# Patient Record
Sex: FEMALE | URBAN - METROPOLITAN AREA
[De-identification: names, ages, dates, MRNs, and addresses within clinical notes are randomized per-mention and may not be internally consistent; named-entity substitution may affect disease eponyms.]

---

## 2022-08-16 PROBLEM — Z00.00 ENCOUNTER FOR PREVENTIVE HEALTH EXAMINATION: Status: ACTIVE | Noted: 2022-08-16

## 2023-12-19 ENCOUNTER — INPATIENT (INPATIENT)
Facility: HOSPITAL | Age: 43
LOS: 1 days | Discharge: HOME | DRG: 776 | End: 2023-12-21
Attending: OBSTETRICS & GYNECOLOGY | Admitting: OBSTETRICS & GYNECOLOGY
Payer: COMMERCIAL

## 2023-12-19 ENCOUNTER — EMERGENCY (EMERGENCY)
Facility: HOSPITAL | Age: 43
LOS: 1 days | Discharge: OTHER INSTITUTION | End: 2023-12-19
Attending: EMERGENCY MEDICINE
Payer: COMMERCIAL

## 2023-12-19 VITALS
OXYGEN SATURATION: 98 % | RESPIRATION RATE: 20 BRPM | SYSTOLIC BLOOD PRESSURE: 155 MMHG | DIASTOLIC BLOOD PRESSURE: 93 MMHG | HEART RATE: 88 BPM | TEMPERATURE: 99 F

## 2023-12-19 VITALS
HEART RATE: 100 BPM | RESPIRATION RATE: 20 BRPM | WEIGHT: 205.03 LBS | SYSTOLIC BLOOD PRESSURE: 170 MMHG | TEMPERATURE: 99 F | DIASTOLIC BLOOD PRESSURE: 104 MMHG | HEIGHT: 62 IN | OXYGEN SATURATION: 100 %

## 2023-12-19 VITALS — OXYGEN SATURATION: 100 % | HEART RATE: 85 BPM

## 2023-12-19 DIAGNOSIS — O26.899 OTHER SPECIFIED PREGNANCY RELATED CONDITIONS, UNSPECIFIED TRIMESTER: ICD-10-CM

## 2023-12-19 DIAGNOSIS — Z34.80 ENCOUNTER FOR SUPERVISION OF OTHER NORMAL PREGNANCY, UNSPECIFIED TRIMESTER: ICD-10-CM

## 2023-12-19 LAB
ALBUMIN SERPL ELPH-MCNC: 3.5 G/DL — SIGNIFICANT CHANGE UP (ref 3.3–5)
ALBUMIN SERPL ELPH-MCNC: 3.5 G/DL — SIGNIFICANT CHANGE UP (ref 3.3–5)
ALP SERPL-CCNC: 115 U/L — SIGNIFICANT CHANGE UP (ref 40–120)
ALP SERPL-CCNC: 115 U/L — SIGNIFICANT CHANGE UP (ref 40–120)
ALT FLD-CCNC: 34 U/L — SIGNIFICANT CHANGE UP (ref 10–45)
ALT FLD-CCNC: 34 U/L — SIGNIFICANT CHANGE UP (ref 10–45)
ANION GAP SERPL CALC-SCNC: 10 MMOL/L — SIGNIFICANT CHANGE UP (ref 5–17)
ANION GAP SERPL CALC-SCNC: 10 MMOL/L — SIGNIFICANT CHANGE UP (ref 5–17)
APPEARANCE UR: CLEAR — SIGNIFICANT CHANGE UP
APPEARANCE UR: CLEAR — SIGNIFICANT CHANGE UP
APTT BLD: 29.5 SEC — SIGNIFICANT CHANGE UP (ref 24.5–35.6)
APTT BLD: 29.5 SEC — SIGNIFICANT CHANGE UP (ref 24.5–35.6)
AST SERPL-CCNC: 46 U/L — HIGH (ref 10–40)
AST SERPL-CCNC: 46 U/L — HIGH (ref 10–40)
BACTERIA # UR AUTO: NEGATIVE /HPF — SIGNIFICANT CHANGE UP
BACTERIA # UR AUTO: NEGATIVE /HPF — SIGNIFICANT CHANGE UP
BASE EXCESS BLDV CALC-SCNC: -4.1 MMOL/L — LOW (ref -2–3)
BASE EXCESS BLDV CALC-SCNC: -4.1 MMOL/L — LOW (ref -2–3)
BASOPHILS # BLD AUTO: 0.02 K/UL — SIGNIFICANT CHANGE UP (ref 0–0.2)
BASOPHILS # BLD AUTO: 0.02 K/UL — SIGNIFICANT CHANGE UP (ref 0–0.2)
BASOPHILS NFR BLD AUTO: 0.2 % — SIGNIFICANT CHANGE UP (ref 0–2)
BASOPHILS NFR BLD AUTO: 0.2 % — SIGNIFICANT CHANGE UP (ref 0–2)
BILIRUB DIRECT SERPL-MCNC: <0.1 MG/DL — SIGNIFICANT CHANGE UP (ref 0–0.3)
BILIRUB DIRECT SERPL-MCNC: <0.1 MG/DL — SIGNIFICANT CHANGE UP (ref 0–0.3)
BILIRUB INDIRECT FLD-MCNC: >0.1 MG/DL — LOW (ref 0.2–1)
BILIRUB INDIRECT FLD-MCNC: >0.1 MG/DL — LOW (ref 0.2–1)
BILIRUB SERPL-MCNC: 0.2 MG/DL — SIGNIFICANT CHANGE UP (ref 0.2–1.2)
BILIRUB SERPL-MCNC: 0.2 MG/DL — SIGNIFICANT CHANGE UP (ref 0.2–1.2)
BILIRUB UR-MCNC: NEGATIVE — SIGNIFICANT CHANGE UP
BILIRUB UR-MCNC: NEGATIVE — SIGNIFICANT CHANGE UP
BUN SERPL-MCNC: 10 MG/DL — SIGNIFICANT CHANGE UP (ref 7–23)
BUN SERPL-MCNC: 10 MG/DL — SIGNIFICANT CHANGE UP (ref 7–23)
CA-I SERPL-SCNC: 1.21 MMOL/L — SIGNIFICANT CHANGE UP (ref 1.15–1.33)
CA-I SERPL-SCNC: 1.21 MMOL/L — SIGNIFICANT CHANGE UP (ref 1.15–1.33)
CALCIUM SERPL-MCNC: 8.6 MG/DL — SIGNIFICANT CHANGE UP (ref 8.4–10.5)
CALCIUM SERPL-MCNC: 8.6 MG/DL — SIGNIFICANT CHANGE UP (ref 8.4–10.5)
CAST: 0 /LPF — SIGNIFICANT CHANGE UP (ref 0–4)
CAST: 0 /LPF — SIGNIFICANT CHANGE UP (ref 0–4)
CHLORIDE BLDV-SCNC: 109 MMOL/L — HIGH (ref 96–108)
CHLORIDE BLDV-SCNC: 109 MMOL/L — HIGH (ref 96–108)
CHLORIDE SERPL-SCNC: 106 MMOL/L — SIGNIFICANT CHANGE UP (ref 96–108)
CHLORIDE SERPL-SCNC: 106 MMOL/L — SIGNIFICANT CHANGE UP (ref 96–108)
CO2 BLDV-SCNC: 24 MMOL/L — SIGNIFICANT CHANGE UP (ref 22–26)
CO2 BLDV-SCNC: 24 MMOL/L — SIGNIFICANT CHANGE UP (ref 22–26)
CO2 SERPL-SCNC: 22 MMOL/L — SIGNIFICANT CHANGE UP (ref 22–31)
CO2 SERPL-SCNC: 22 MMOL/L — SIGNIFICANT CHANGE UP (ref 22–31)
COLOR SPEC: YELLOW — SIGNIFICANT CHANGE UP
COLOR SPEC: YELLOW — SIGNIFICANT CHANGE UP
CREAT SERPL-MCNC: 0.64 MG/DL — SIGNIFICANT CHANGE UP (ref 0.5–1.3)
CREAT SERPL-MCNC: 0.64 MG/DL — SIGNIFICANT CHANGE UP (ref 0.5–1.3)
DIFF PNL FLD: ABNORMAL
DIFF PNL FLD: ABNORMAL
EGFR: 112 ML/MIN/1.73M2 — SIGNIFICANT CHANGE UP
EGFR: 112 ML/MIN/1.73M2 — SIGNIFICANT CHANGE UP
EOSINOPHIL # BLD AUTO: 0.07 K/UL — SIGNIFICANT CHANGE UP (ref 0–0.5)
EOSINOPHIL # BLD AUTO: 0.07 K/UL — SIGNIFICANT CHANGE UP (ref 0–0.5)
EOSINOPHIL NFR BLD AUTO: 0.8 % — SIGNIFICANT CHANGE UP (ref 0–6)
EOSINOPHIL NFR BLD AUTO: 0.8 % — SIGNIFICANT CHANGE UP (ref 0–6)
GAS PNL BLDV: 137 MMOL/L — SIGNIFICANT CHANGE UP (ref 136–145)
GAS PNL BLDV: 137 MMOL/L — SIGNIFICANT CHANGE UP (ref 136–145)
GAS PNL BLDV: SIGNIFICANT CHANGE UP
GLUCOSE BLDV-MCNC: 81 MG/DL — SIGNIFICANT CHANGE UP (ref 70–99)
GLUCOSE BLDV-MCNC: 81 MG/DL — SIGNIFICANT CHANGE UP (ref 70–99)
GLUCOSE SERPL-MCNC: 91 MG/DL — SIGNIFICANT CHANGE UP (ref 70–99)
GLUCOSE SERPL-MCNC: 91 MG/DL — SIGNIFICANT CHANGE UP (ref 70–99)
GLUCOSE UR QL: NEGATIVE MG/DL — SIGNIFICANT CHANGE UP
GLUCOSE UR QL: NEGATIVE MG/DL — SIGNIFICANT CHANGE UP
HCO3 BLDV-SCNC: 22 MMOL/L — SIGNIFICANT CHANGE UP (ref 22–29)
HCO3 BLDV-SCNC: 22 MMOL/L — SIGNIFICANT CHANGE UP (ref 22–29)
HCT VFR BLD CALC: 26.9 % — LOW (ref 34.5–45)
HCT VFR BLD CALC: 26.9 % — LOW (ref 34.5–45)
HCT VFR BLDA CALC: 24 % — LOW (ref 34.5–46.5)
HCT VFR BLDA CALC: 24 % — LOW (ref 34.5–46.5)
HGB BLD CALC-MCNC: 8.1 G/DL — LOW (ref 11.7–16.1)
HGB BLD CALC-MCNC: 8.1 G/DL — LOW (ref 11.7–16.1)
HGB BLD-MCNC: 7.9 G/DL — LOW (ref 11.5–15.5)
HGB BLD-MCNC: 7.9 G/DL — LOW (ref 11.5–15.5)
IMM GRANULOCYTES NFR BLD AUTO: 1 % — HIGH (ref 0–0.9)
IMM GRANULOCYTES NFR BLD AUTO: 1 % — HIGH (ref 0–0.9)
INR BLD: 0.94 RATIO — SIGNIFICANT CHANGE UP (ref 0.85–1.18)
INR BLD: 0.94 RATIO — SIGNIFICANT CHANGE UP (ref 0.85–1.18)
KETONES UR-MCNC: NEGATIVE MG/DL — SIGNIFICANT CHANGE UP
KETONES UR-MCNC: NEGATIVE MG/DL — SIGNIFICANT CHANGE UP
LACTATE BLDV-MCNC: 2 MMOL/L — SIGNIFICANT CHANGE UP (ref 0.5–2)
LACTATE BLDV-MCNC: 2 MMOL/L — SIGNIFICANT CHANGE UP (ref 0.5–2)
LDH SERPL L TO P-CCNC: 419 U/L — HIGH (ref 50–242)
LDH SERPL L TO P-CCNC: 419 U/L — HIGH (ref 50–242)
LEUKOCYTE ESTERASE UR-ACNC: NEGATIVE — SIGNIFICANT CHANGE UP
LEUKOCYTE ESTERASE UR-ACNC: NEGATIVE — SIGNIFICANT CHANGE UP
LYMPHOCYTES # BLD AUTO: 1.31 K/UL — SIGNIFICANT CHANGE UP (ref 1–3.3)
LYMPHOCYTES # BLD AUTO: 1.31 K/UL — SIGNIFICANT CHANGE UP (ref 1–3.3)
LYMPHOCYTES # BLD AUTO: 14.3 % — SIGNIFICANT CHANGE UP (ref 13–44)
LYMPHOCYTES # BLD AUTO: 14.3 % — SIGNIFICANT CHANGE UP (ref 13–44)
MAGNESIUM SERPL-MCNC: 2 MG/DL — SIGNIFICANT CHANGE UP (ref 1.6–2.6)
MAGNESIUM SERPL-MCNC: 2 MG/DL — SIGNIFICANT CHANGE UP (ref 1.6–2.6)
MCHC RBC-ENTMCNC: 23.2 PG — LOW (ref 27–34)
MCHC RBC-ENTMCNC: 23.2 PG — LOW (ref 27–34)
MCHC RBC-ENTMCNC: 29.4 GM/DL — LOW (ref 32–36)
MCHC RBC-ENTMCNC: 29.4 GM/DL — LOW (ref 32–36)
MCV RBC AUTO: 78.9 FL — LOW (ref 80–100)
MCV RBC AUTO: 78.9 FL — LOW (ref 80–100)
MONOCYTES # BLD AUTO: 1.15 K/UL — HIGH (ref 0–0.9)
MONOCYTES # BLD AUTO: 1.15 K/UL — HIGH (ref 0–0.9)
MONOCYTES NFR BLD AUTO: 12.6 % — SIGNIFICANT CHANGE UP (ref 2–14)
MONOCYTES NFR BLD AUTO: 12.6 % — SIGNIFICANT CHANGE UP (ref 2–14)
NEUTROPHILS # BLD AUTO: 6.51 K/UL — SIGNIFICANT CHANGE UP (ref 1.8–7.4)
NEUTROPHILS # BLD AUTO: 6.51 K/UL — SIGNIFICANT CHANGE UP (ref 1.8–7.4)
NEUTROPHILS NFR BLD AUTO: 71.1 % — SIGNIFICANT CHANGE UP (ref 43–77)
NEUTROPHILS NFR BLD AUTO: 71.1 % — SIGNIFICANT CHANGE UP (ref 43–77)
NITRITE UR-MCNC: NEGATIVE — SIGNIFICANT CHANGE UP
NITRITE UR-MCNC: NEGATIVE — SIGNIFICANT CHANGE UP
NRBC # BLD: 0 /100 WBCS — SIGNIFICANT CHANGE UP (ref 0–0)
NRBC # BLD: 0 /100 WBCS — SIGNIFICANT CHANGE UP (ref 0–0)
NT-PROBNP SERPL-SCNC: 798 PG/ML — HIGH (ref 0–300)
NT-PROBNP SERPL-SCNC: 798 PG/ML — HIGH (ref 0–300)
PCO2 BLDV: 45 MMHG — HIGH (ref 39–42)
PCO2 BLDV: 45 MMHG — HIGH (ref 39–42)
PH BLDV: 7.3 — LOW (ref 7.32–7.43)
PH BLDV: 7.3 — LOW (ref 7.32–7.43)
PH UR: 7 — SIGNIFICANT CHANGE UP (ref 5–8)
PH UR: 7 — SIGNIFICANT CHANGE UP (ref 5–8)
PLATELET # BLD AUTO: 377 K/UL — SIGNIFICANT CHANGE UP (ref 150–400)
PLATELET # BLD AUTO: 377 K/UL — SIGNIFICANT CHANGE UP (ref 150–400)
PO2 BLDV: 30 MMHG — SIGNIFICANT CHANGE UP (ref 25–45)
PO2 BLDV: 30 MMHG — SIGNIFICANT CHANGE UP (ref 25–45)
POTASSIUM BLDV-SCNC: 4.2 MMOL/L — SIGNIFICANT CHANGE UP (ref 3.5–5.1)
POTASSIUM BLDV-SCNC: 4.2 MMOL/L — SIGNIFICANT CHANGE UP (ref 3.5–5.1)
POTASSIUM SERPL-MCNC: 4.1 MMOL/L — SIGNIFICANT CHANGE UP (ref 3.5–5.3)
POTASSIUM SERPL-MCNC: 4.1 MMOL/L — SIGNIFICANT CHANGE UP (ref 3.5–5.3)
POTASSIUM SERPL-SCNC: 4.1 MMOL/L — SIGNIFICANT CHANGE UP (ref 3.5–5.3)
POTASSIUM SERPL-SCNC: 4.1 MMOL/L — SIGNIFICANT CHANGE UP (ref 3.5–5.3)
PROT SERPL-MCNC: 6.2 G/DL — SIGNIFICANT CHANGE UP (ref 6–8.3)
PROT SERPL-MCNC: 6.2 G/DL — SIGNIFICANT CHANGE UP (ref 6–8.3)
PROT UR-MCNC: NEGATIVE MG/DL — SIGNIFICANT CHANGE UP
PROT UR-MCNC: NEGATIVE MG/DL — SIGNIFICANT CHANGE UP
PROTHROM AB SERPL-ACNC: 10.4 SEC — SIGNIFICANT CHANGE UP (ref 9.5–13)
PROTHROM AB SERPL-ACNC: 10.4 SEC — SIGNIFICANT CHANGE UP (ref 9.5–13)
RBC # BLD: 3.41 M/UL — LOW (ref 3.8–5.2)
RBC # BLD: 3.41 M/UL — LOW (ref 3.8–5.2)
RBC # FLD: 17.3 % — HIGH (ref 10.3–14.5)
RBC # FLD: 17.3 % — HIGH (ref 10.3–14.5)
RBC CASTS # UR COMP ASSIST: 1 /HPF — SIGNIFICANT CHANGE UP (ref 0–4)
RBC CASTS # UR COMP ASSIST: 1 /HPF — SIGNIFICANT CHANGE UP (ref 0–4)
REVIEW: SIGNIFICANT CHANGE UP
REVIEW: SIGNIFICANT CHANGE UP
SAO2 % BLDV: 30.5 % — LOW (ref 67–88)
SAO2 % BLDV: 30.5 % — LOW (ref 67–88)
SODIUM SERPL-SCNC: 138 MMOL/L — SIGNIFICANT CHANGE UP (ref 135–145)
SODIUM SERPL-SCNC: 138 MMOL/L — SIGNIFICANT CHANGE UP (ref 135–145)
SP GR SPEC: 1.01 — SIGNIFICANT CHANGE UP (ref 1–1.03)
SP GR SPEC: 1.01 — SIGNIFICANT CHANGE UP (ref 1–1.03)
SQUAMOUS # UR AUTO: 2 /HPF — SIGNIFICANT CHANGE UP (ref 0–5)
SQUAMOUS # UR AUTO: 2 /HPF — SIGNIFICANT CHANGE UP (ref 0–5)
TROPONIN T, HIGH SENSITIVITY RESULT: 17 NG/L — SIGNIFICANT CHANGE UP (ref 0–51)
TROPONIN T, HIGH SENSITIVITY RESULT: 17 NG/L — SIGNIFICANT CHANGE UP (ref 0–51)
URATE SERPL-MCNC: 4.3 MG/DL — SIGNIFICANT CHANGE UP (ref 2.5–7)
URATE SERPL-MCNC: 4.3 MG/DL — SIGNIFICANT CHANGE UP (ref 2.5–7)
UROBILINOGEN FLD QL: 1 MG/DL — SIGNIFICANT CHANGE UP (ref 0.2–1)
UROBILINOGEN FLD QL: 1 MG/DL — SIGNIFICANT CHANGE UP (ref 0.2–1)
WBC # BLD: 9.15 K/UL — SIGNIFICANT CHANGE UP (ref 3.8–10.5)
WBC # BLD: 9.15 K/UL — SIGNIFICANT CHANGE UP (ref 3.8–10.5)
WBC # FLD AUTO: 9.15 K/UL — SIGNIFICANT CHANGE UP (ref 3.8–10.5)
WBC # FLD AUTO: 9.15 K/UL — SIGNIFICANT CHANGE UP (ref 3.8–10.5)
WBC UR QL: 1 /HPF — SIGNIFICANT CHANGE UP (ref 0–5)
WBC UR QL: 1 /HPF — SIGNIFICANT CHANGE UP (ref 0–5)

## 2023-12-19 PROCEDURE — 76376 3D RENDER W/INTRP POSTPROCES: CPT | Mod: 26

## 2023-12-19 PROCEDURE — XXXXX: CPT | Mod: 1L

## 2023-12-19 PROCEDURE — 70450 CT HEAD/BRAIN W/O DYE: CPT | Mod: 26,MA

## 2023-12-19 PROCEDURE — 99222 1ST HOSP IP/OBS MODERATE 55: CPT

## 2023-12-19 PROCEDURE — 93356 MYOCRD STRAIN IMG SPCKL TRCK: CPT

## 2023-12-19 PROCEDURE — 99291 CRITICAL CARE FIRST HOUR: CPT

## 2023-12-19 PROCEDURE — 93308 TTE F-UP OR LMTD: CPT | Mod: 26

## 2023-12-19 PROCEDURE — 71045 X-RAY EXAM CHEST 1 VIEW: CPT | Mod: 26

## 2023-12-19 PROCEDURE — 93306 TTE W/DOPPLER COMPLETE: CPT | Mod: 26

## 2023-12-19 PROCEDURE — 71275 CT ANGIOGRAPHY CHEST: CPT | Mod: 26,MA

## 2023-12-19 RX ORDER — MAGNESIUM SULFATE 500 MG/ML
4 VIAL (ML) INJECTION ONCE
Refills: 0 | Status: DISCONTINUED | OUTPATIENT
Start: 2023-12-19 | End: 2023-12-19

## 2023-12-19 RX ORDER — ACETAMINOPHEN 500 MG
975 TABLET ORAL ONCE
Refills: 0 | Status: COMPLETED | OUTPATIENT
Start: 2023-12-19 | End: 2023-12-19

## 2023-12-19 RX ORDER — LABETALOL HCL 100 MG
200 TABLET ORAL EVERY 12 HOURS
Refills: 0 | Status: DISCONTINUED | OUTPATIENT
Start: 2023-12-19 | End: 2023-12-19

## 2023-12-19 RX ORDER — MAGNESIUM SULFATE 500 MG/ML
2 VIAL (ML) INJECTION
Qty: 40 | Refills: 0 | Status: DISCONTINUED | OUTPATIENT
Start: 2023-12-19 | End: 2023-12-19

## 2023-12-19 RX ORDER — FUROSEMIDE 40 MG
40 TABLET ORAL ONCE
Refills: 0 | Status: COMPLETED | OUTPATIENT
Start: 2023-12-19 | End: 2023-12-19

## 2023-12-19 RX ORDER — HEPARIN SODIUM 5000 [USP'U]/ML
5000 INJECTION INTRAVENOUS; SUBCUTANEOUS EVERY 12 HOURS
Refills: 0 | Status: DISCONTINUED | OUTPATIENT
Start: 2023-12-19 | End: 2023-12-21

## 2023-12-19 RX ORDER — HEPARIN SODIUM 5000 [USP'U]/ML
5000 INJECTION INTRAVENOUS; SUBCUTANEOUS EVERY 12 HOURS
Refills: 0 | Status: DISCONTINUED | OUTPATIENT
Start: 2023-12-19 | End: 2023-12-19

## 2023-12-19 RX ORDER — NIFEDIPINE 30 MG
30 TABLET, EXTENDED RELEASE 24 HR ORAL DAILY
Refills: 0 | Status: DISCONTINUED | OUTPATIENT
Start: 2023-12-19 | End: 2023-12-20

## 2023-12-19 RX ORDER — NIFEDIPINE 30 MG
30 TABLET, EXTENDED RELEASE 24 HR ORAL DAILY
Refills: 0 | Status: DISCONTINUED | OUTPATIENT
Start: 2023-12-19 | End: 2023-12-23

## 2023-12-19 RX ORDER — IBUPROFEN 200 MG
600 TABLET ORAL ONCE
Refills: 0 | Status: COMPLETED | OUTPATIENT
Start: 2023-12-19 | End: 2023-12-19

## 2023-12-19 RX ORDER — ACETAMINOPHEN 500 MG
975 TABLET ORAL EVERY 6 HOURS
Refills: 0 | Status: DISCONTINUED | OUTPATIENT
Start: 2023-12-19 | End: 2023-12-23

## 2023-12-19 RX ADMIN — Medication 975 MILLIGRAM(S): at 18:33

## 2023-12-19 RX ADMIN — Medication 30 MILLIGRAM(S): at 21:26

## 2023-12-19 RX ADMIN — Medication 975 MILLIGRAM(S): at 19:21

## 2023-12-19 RX ADMIN — Medication 600 MILLIGRAM(S): at 20:40

## 2023-12-19 RX ADMIN — Medication 40 MILLIGRAM(S): at 18:33

## 2023-12-19 NOTE — PATIENT PROFILE ADULT - FALL HARM RISK - UNIVERSAL INTERVENTIONS
Bed in lowest position, wheels locked, appropriate side rails in place/Call bell, personal items and telephone in reach/Instruct patient to call for assistance before getting out of bed or chair/Non-slip footwear when patient is out of bed/[place] to call system/Physically safe environment - no spills, clutter or unnecessary equipment/Purposeful Proactive Rounding/Room/bathroom lighting operational, light cord in reach Bed in lowest position, wheels locked, appropriate side rails in place/Call bell, personal items and telephone in reach/Instruct patient to call for assistance before getting out of bed or chair/Non-slip footwear when patient is out of bed/[place] to call system/Physically safe environment - no spills, clutter or unnecessary equipment/Purposeful Proactive Rounding/Room/bathroom lighting operational, light cord in reach

## 2023-12-19 NOTE — ED PROCEDURE NOTE - US CPT CODES
[id number] Echocardiography Transthoracic with Image 2D (Echo/FAST) [id number] Echocardiography Transthoracic with Image 2D (Echo/FAST)

## 2023-12-19 NOTE — ED PROVIDER NOTE - ATTENDING CONTRIBUTION TO CARE
This is 43-year-old female status post  section now with shortness of breath and worsening of her leg swellings.  Blood pressures 170s at home and she is not taking any medications at this time.  Pitting edema bilaterally regular rate and rhythm clear lungs awake alert talking no acute distress  Discussed with OB/GYN.  On initial evaluation the patient only had 1 episode of blood pressure above 160 and they did not recommend magnesium.  On repeat blood pressure was in the 140s.  They recommended serial blood pressures.  During the evaluation the diastolic blood pressure was 110 and they recommended magnesium.  We started magnesium infusion.  Admit to the OB/GYN unit.    CT head / cbc/cmp/ua  tte bedside w bline - dw cards as noted. rec for lasix / tte  Evaluating for critical conditions noted above. Ordering tests. Reviewing results. Ordering medications as noted in MAR. Discussions with consultant.

## 2023-12-19 NOTE — ED PROVIDER NOTE - CARE PLAN
Principal Discharge DX:	Pre-eclampsia, postpartum  Secondary Diagnosis:	Acute systolic congestive heart failure   1

## 2023-12-19 NOTE — ED ADULT NURSE NOTE - NSFALLRISKINTERV_ED_ALL_ED
Assistance OOB with selected safe patient handling equipment if applicable/Assistance with ambulation/Communicate fall risk and risk factors to all staff, patient, and family/Monitor gait and stability/Provide visual cue: yellow wristband, yellow gown, etc/Reinforce activity limits and safety measures with patient and family/Call bell, personal items and telephone in reach/Instruct patient to call for assistance before getting out of bed/chair/stretcher/Non-slip footwear applied when patient is off stretcher/[place] to call system/Physically safe environment - no spills, clutter or unnecessary equipment/Purposeful Proactive Rounding/Room/bathroom lighting operational, light cord in reach Assistance OOB with selected safe patient handling equipment if applicable/Assistance with ambulation/Communicate fall risk and risk factors to all staff, patient, and family/Monitor gait and stability/Provide visual cue: yellow wristband, yellow gown, etc/Reinforce activity limits and safety measures with patient and family/Call bell, personal items and telephone in reach/Instruct patient to call for assistance before getting out of bed/chair/stretcher/Non-slip footwear applied when patient is off stretcher/[place] to call system/Physically safe environment - no spills, clutter or unnecessary equipment/Purposeful Proactive Rounding/Room/bathroom lighting operational, light cord in reach

## 2023-12-19 NOTE — ED ADULT NURSE NOTE - OBJECTIVE STATEMENT
Patient c/o headache and HTN 5 days postpartum. Patient had preeclampsia throughout pregnancy, C section performed on 12/13 (41 wks). Patient also c/o b/l LE swelling which she states she has had since 38 wks. Patient denies fever, n/v/d, sob, chest pain. Patient A&Ox4, ambulatory. Placed on cardiac monitor and closely monitoring BP.

## 2023-12-19 NOTE — ED ADULT NURSE REASSESSMENT NOTE - NS ED NURSE REASSESS COMMENT FT1
Report received from CARMEL Fulton. PT is resting comfortably in bed, breathing unlabored on room air, and speaking in complete sentences. Updated PT on plan of care. Report given to OB by day shift RN, awaiting transport to OB PACU. Safety and comfort maintained. Call bell within reach. Family at the bedside.

## 2023-12-19 NOTE — CONSULT NOTE ADULT - ATTENDING COMMENTS
Peripartum HTN, edema. Improving with nifedipine and diuretics. Echo unremarkable. Continue to optimize medications / volume status. Should be seen by cardio-OB.

## 2023-12-19 NOTE — H&P ADULT - ASSESSMENT
Patient is a 44yo  now POD#6 s/p primary  section for worsening preeclampsia with severe features presenting with headache and SOB for the past 2 days. First BP on presentation to /104 with persistent mild range pressures for several hours, followed by second severe range pressure of 141/110. Will admit to antepartum service for management of preeclampsia with severe features.    #sPEC  - Start Procardia 30XL daily, cleared by preliminary Cards recs  - Not requiring immediate release antihypertensive at this time, as severe range pressures >1hr apart  - Start Magnesium for seizure prophylaxis  - AM HELLP labs  - Will likely plan for Cardio OB follow up outpatient    #B/l pleural effusions  - Cardio: TTE to r/o PPCM, CTA to rule out PE, Lasix 40mg IVP  - CXR-p: small layering right and trace left pleural effusions    #PP state  - PO Tylenol, Motrin as needed for postoperative pain control  - SCDs for DVT prophylaxis, will hold HSQ in setting of anemia  - Regular diet    Seen with GYN service attending, Dr. Rashi Zamora PGY2

## 2023-12-19 NOTE — H&P ADULT - NSHPPHYSICALEXAM_GEN_ALL_CORE
Vital Signs Last 24 Hrs  T(C): 37 (19 Dec 2023 14:54), Max: 37 (19 Dec 2023 14:54)  T(F): 98.6 (19 Dec 2023 14:54), Max: 98.6 (19 Dec 2023 14:54)  HR: 84 (19 Dec 2023 18:00) (84 - 100)  BP: 143/89 (19 Dec 2023 18:00) (135/83 - 170/104)  BP(mean): 110 (19 Dec 2023 15:45) (110 - 113)  RR: 18 (19 Dec 2023 18:00) (18 - 20)  SpO2: 100% (19 Dec 2023 18:00) (100% - 100%)    Parameters below as of 19 Dec 2023 18:00  Patient On (Oxygen Delivery Method): room air    Physical Exam:   General: sitting comfortably in bed, NAD at rest  HEENT: neck supple, full ROM  CV: extremities well perfused  Lungs: visibly dyspneic when talking  Abd: Soft, mild tenderness to lower abd near incision, non-distended  Skin: Pfannensteil incision with small regions of superficial separation, no drainage, no erythema or induration  Ext: 2-3+ pitting edema to level of knees bilaterally

## 2023-12-19 NOTE — H&P ADULT - NSHPLABSRESULTS_GEN_ALL_CORE
LABS:                        7.9    9.15  )-----------( 377      ( 19 Dec 2023 15:25 )             26.9     -    138  |  106  |  10  ----------------------------<  91  4.1   |  22  |  0.64    Ca    8.6      19 Dec 2023 15:25  Mg     2.0         TPro  6.2  /  Alb  3.5  /  TBili  0.2  /  DBili  <0.1  /  AST  46<H>  /  ALT  34  /  AlkPhos  115  12-    PT/INR - ( 19 Dec 2023 15:25 )   PT: 10.4 sec;   INR: 0.94 ratio    PTT - ( 19 Dec 2023 15:25 )  PTT:29.5 sec    I&O's Detail  Urinalysis Basic - ( 19 Dec 2023 17:13 )    Color: Yellow / Appearance: Clear / S.007 / pH: x  Gluc: x / Ketone: Negative mg/dL  / Bili: Negative / Urobili: 1.0 mg/dL   Blood: x / Protein: Negative mg/dL / Nitrite: Negative   Leuk Esterase: Negative / RBC: 1 /HPF / WBC 1 /HPF   Sq Epi: x / Non Sq Epi: 2 /HPF / Bacteria: Negative /HPF    RADIOLOGY & ADDITIONAL STUDIES:  < from: POCUS ED TTE 2D F/U, Limited w/o Cont. (23 @ 15:50) >  Procedure was performed in the Emergency Department by a credentialed   Emergency Medicine Attending Physician    EXAM:  ER TTE LIMITED      ORDER COMMENTS: post partum     PROCEDURE DATE:  2023    FOCUSED ED ULTRASOUND REPORT    INTERPRETATION:  A focused transthoracic cardiac ultrasound examination   was performed.  A trace pericardial effusion was visualized.  No global wall motion abnormality was identified.  The right ventricle is not grossly dilated.  The IVC is not plethoric.  The apical lung fields display lung sliding and a line predominance   bilaterally.  The axillary lung fields display b lines diffusely.  There are bilateral small pleural effusions.    IMPRESSION:  Trace Pericardial Effusion with no gross evidence of tamponade.  Bilateral b-lines and small pleural effusions suggesting pulmonary edema   are present.    --- End of Report ---    SHAR LYNCH MD; Attending Emergency Medicine  This document has been electronically signed. Dec 19 2023  3:50PM    < end of copied text >

## 2023-12-19 NOTE — ED PROVIDER NOTE - PHYSICAL EXAMINATION
Gen: Patient is NAD, AAOx3, able to ambulate without assistance  HEENT: NCAT, normal conjunctiva, tongue midline, oral mucosa moist  Lung: CTAB, mildly tachypneic, no wheezes/rhonchi/rales B/L,   CV: RRR, no murmurs, rubs or gallops, distal pulses 2+ b/l  Abd: soft, incision site is CDI, generalized tenderness, ND, no guarding, no rigidity, no rebound tenderness  MSK: no visible deformities, ROM normal in UE/LE  Neuro: No focal sensory or motor deficits  Skin: Warm, well perfused, no rash, pitting edema bilaterally   Psych: normal affect, calm

## 2023-12-19 NOTE — H&P ADULT - HISTORY OF PRESENT ILLNESS
MELINDA SCALES  43y  Female [id number]    HPI: Patient is a 44yo  now POD#6 s/p primary  section for worsening preeclampsia with severe features presenting with headache and SOB for the past 2 days. Pt reports she had a scheduled late term induction of labor on  at [facility], she then developed preeclampsia with severe features intrapartum, was started on mg for seizure prophylaxis and antihypertensives. Mode of delivery was reportedly  () due to worsening preeclampsia, per patient. She was discharged on POD#3 without a plan for continuation of antihypertensives. Pt reports onset of headache  morning, with shortness of breath on light exertion beginning yesterday. Also reports significant LE swelling that has been stable since 38wks gestation. Denies vision changes, RUQ pain, epigastric pain, nausea, vomiting. Of note, pt had been taking bASA for preeclampsia prevention in the setting of advanced maternal age, IVF pregnancy.    Name of GYN Physician: [facility], delivered by Dr. Behar at [facility]  OBHx: SAB x1, MAB x1 with D&C, primary  section x1 (23) c/b sPEC  GynHx: Denies fibroids, cysts, endometriosis, STI's, Abnormal pap smears   PMH: migraines, asthma (last used inhaler last winter)  PSH: C/s x1, D&C x1 foot surgery  Meds: Albuterol prn (has not used since last winter)  Allx: NKDA  Social History: former cigarette use, denies current smoking use, drug use, alcohol use MELINDA SCALES  43y  Female [id number]    HPI: Patient is a 44yo  now POD#6 s/p primary  section for worsening preeclampsia with severe features presenting with headache and SOB for the past 2 days. Pt reports she had a scheduled late term induction of labor on  at [facility], she then developed preeclampsia with severe features intrapartum, was started on mg for seizure prophylaxis and antihypertensives. Mode of delivery was reportedly  () due to worsening preeclampsia, per patient. She was discharged on POD#3 without a plan for continuation of antihypertensives. Pt reports onset of headache  morning, with shortness of breath on light exertion beginning yesterday. Also reports significant LE swelling that has been stable since 38wks gestation. Denies vision changes, RUQ pain, epigastric pain, nausea, vomiting. Of note, pt had been taking bASA for preeclampsia prevention in the setting of advanced maternal age, IVF pregnancy.    Name of GYN Physician: [facility], delivered by Dr. Behar at [facility]  OBHx: SAB x1, MAB x1 with D&C, primary  section x1 (23) c/b sPEC  GynHx: Denies fibroids, cysts, endometriosis, STI's, Abnormal pap smears   PMH: migraines, asthma (last used inhaler last winter)  PSH: C/s x1, D&C x1 foot surgery  Meds: Albuterol prn (has not used since last winter)  Allx: NKDA  Social History: former cigarette use, denies current smoking use, drug use, alcohol use

## 2023-12-19 NOTE — CONSULT NOTE ADULT - ASSESSMENT
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    Recommendations:  - TTE to evaluate for PPCM  - Agree with CTA to rule out PE. Given history of multiple miscarriages, if clot is present, APLS could be on differential  - Can give nifedipine or hydralazine for elevated BPs.  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    Recommendations:  - TTE to evaluate for PPCM  - Agree with CTA to rule out PE. Given history of multiple miscarriages, if clot is present, APLS could be on differential  - Can give nifedipine or hydralazine for elevated BPs.  - Magnesium for pre-eclampsia per Gyn  - Give 40mg IV Lasix now  - Cardiology to follow    ***Note not finalized until co-signed by attending***    Conrad Mckinney MD  Cardiology Fellow  All Cardiology service information can be found  on [email or web address], password: [email or web address]

## 2023-12-19 NOTE — ED PROVIDER NOTE - OBJECTIVE STATEMENT
43y F, hx of asthma, recent  on  due to pre-eclampsia (41-wk), p/w 43y F, hx of asthma, recent  on  due to pre-eclampsia (41-wk), p/w HA, SOB, b/l LE swelling. No fever, abdominal pain, abnormal vaginal bleeding, discharge.  on arrival. Not on anit-hypertensive medications.

## 2023-12-19 NOTE — H&P ADULT - ATTENDING COMMENTS
/OBGYN Attending Note: Agree with above, rhona seen by me in the ER with the resident and Admitted   44 y/o  021 S/P C/S 1 week ago at a different hospital history of preeclampsia not currently on antihypertensive. Rhona presents with headache and shortness of breath, + severe range blood pressures noted, Ruled out for a pulmonary embolism by EM. PE: Pt in NAD, appeared SOB, +elevated BP  Abdomen: Soft, nondistended, + mild tnederness, no rebound, incision minimal superficial skin separation in the midline, but intact, not bleeding, no drainage of pus, no erythema.  Admit to postpartum service with Postpartum rebound preeclampsia. Paitnet started on antihypertensive, procardia 30 mg XL, patinet to obtain a CT of the head. Patinet evaluated by cardiology, recommendations echocardiogram, lasix for b/l pleural efufsions. Consider antiseizure medication with keppra possibly magnesium sulfate if meets criteria and is stable. Continue to monitor.  Danica Markham MD /OBGYN Attending Note: Agree with above, rhona seen by me in the ER with the resident and Admitted   42 y/o  021 S/P C/S 1 week ago at a different hospital history of preeclampsia not currently on antihypertensive. Rhona presents with headache and shortness of breath, + severe range blood pressures noted, Ruled out for a pulmonary embolism by EM. PE: Pt in NAD, appeared SOB, +elevated BP  Abdomen: Soft, nondistended, + mild tnederness, no rebound, incision minimal superficial skin separation in the midline, but intact, not bleeding, no drainage of pus, no erythema.  Admit to postpartum service with Postpartum rebound preeclampsia. Paitnet started on antihypertensive, procardia 30 mg XL, patinet to obtain a CT of the head. Patinet evaluated by cardiology, recommendations echocardiogram, lasix for b/l pleural efufsions. Consider antiseizure medication with keppra possibly magnesium sulfate if meets criteria and is stable. Continue to monitor.  Danica Markham MD

## 2023-12-19 NOTE — PATIENT PROFILE ADULT - DOES PATIENT HAVE ADVANCE DIRECTIVE
Maryanne Perea  Obstetrics and Gynecology  [address] [place] [id number]  Phone: [phone]  Fax: [phone]  Follow Up Time:    No

## 2023-12-19 NOTE — CONSULT NOTE ADULT - SUBJECTIVE AND OBJECTIVE BOX
Conrad Mckinney MD  Cardiology Fellow  All Cardiology service information can be found  on [email or web address], password: [email or web address]    Patient seen and evaluated at bedside    Chief Complaint: Shortness of breath    HPI:  44 yo  s/p  on  complicated by pre-eclampsia during labor at [facility] presents with complaints of worsening shortness of breath, headache, and bilateral lower extremity edema. The symptoms have been getting worse for 3 days, since being discharged on Saturday. She denies any chest pain or cardiac history. She reports that her BPs were not controlled when she was discharged, and was not discharged on any antihypertensives.     Shortness of breath is exertional and she also endorses orthopnea. Headache is not associated with numbness or weakness. Lower extremity edema has been present since she was 38 weeks and has been worsening. Until the pre-eclampsia, this pregnancy has been uncomplicated and her baby is in good health.    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    In ED, afebrile, HR , /104, RR 20, SpO2 100% on RA    PMHx:   Pre-eclampsia    Asthma    PSHx:    23    Allergies:  No Known Allergies    Home Medications:  ASA 81    Current Medications:   acetaminophen     Tablet .. 975 milliGRAM(s) Oral once  furosemide   Injectable 40 milliGRAM(s) IV Push Once      FAMILY HISTORY:  HTN    Social History:  Smoking History: Denies  Alcohol Use:Denies  Drug Use: Denies    REVIEW OF SYSTEMS:  CONSTITUTIONAL: No weakness, fevers or chills  RESPIRATORY: No cough, wheezing, hemoptysis; No shortness of breath  CARDIOVASCULAR: No chest pain or palpitations; +lower extremity edema  HEME: No bleeding  GENITOURINARY: No dysuria, frequency or hematuria  NEUROLOGICAL: No numbness or weakness. +HA  SKIN: No itching, burning, rashes, or lesions   All other review of systems is negative unless indicated above.    Physical Exam:  T(F): 98.6 (12-19), Max: 98.6 (12-19)  HR: 92 (12-19) (92 - 100)  BP: 146/83 (12-19) (135/83 - 170/104)  RR: 18 (12-19)  SpO2: 100% (12-19)  GENERAL: Uncomfortable appearing woman sitting upright  HEAD:  Atraumatic, Normocephalic  ENT: EOMI, PERRLA, conjunctiva and sclera clear,+ JVD, moist mucosa  CHEST/LUNG: Faint crackles bilaterally; No wheeze, equal breath sounds bilaterally. Breathing is labored on room air  HEART: Regular rate and rhythm;  Brief, late-peaking 2/6 murmur in LUSB.  EXTREMITIES:  3+ BLE edema  PSYCH: Nl behavior, nl affect  NEUROLOGY: AAOx3, non-focal, cranial nerves intact  SKIN: Normal color, No rashes or lesions    LINES:    Cardiovascular Diagnostic Testing:    ECG: Personally reviewed:  Sinus rhythm HR 90, no evidence of ischemia    Echo: Personally reviewed:    Stress Testing:    Cath:    Imaging:    CXR: Personally reviewed  < from: Xray Chest 1 View AP/PA (23 @ 17:27) >  INTERPRETATION:  Small layering right and trace left pleural effusions.    < end of copied text >      Labs: Personally reviewed                        7.9    9.15  )-----------( 377      ( 19 Dec 2023 15:25 )             26.9     12-    138  |  106  |  10  ----------------------------<  91  4.1   |  22  |  0.64    Ca    8.6      19 Dec 2023 15:25  Mg     2.0     12-    TPro  6.2  /  Alb  3.5  /  TBili  0.2  /  DBili  <0.1  /  AST  46<H>  /  ALT  34  /  AlkPhos  115  12-    PT/INR - ( 19 Dec 2023 15:25 )   PT: 10.4 sec;   INR: 0.94 ratio         PTT - ( 19 Dec 2023 15:25 )  PTT:29.5 sec    CARDIAC MARKERS ( 19 Dec 2023 15:25 )  17 ng/L / x     / x     / x     / x     / x        Pro-Brain Natriuretic Peptide: 798 pg/mL (23 @ 15:25)            oCnrad Mckinney MD  Cardiology Fellow  All Cardiology service information can be found  on [email or web address], password: [email or web address]    Patient seen and evaluated at bedside    Chief Complaint: Shortness of breath    HPI:  44 yo  s/p  on  complicated by pre-eclampsia during labor at [facility] presents with complaints of worsening shortness of breath, headache, and bilateral lower extremity edema. The symptoms have been getting worse for 3 days, since being discharged on Saturday. She denies any chest pain or cardiac history. She reports that her BPs were not controlled when she was discharged, and was not discharged on any antihypertensives.     Shortness of breath is exertional and she also endorses orthopnea. Headache is not associated with numbness or weakness. Lower extremity edema has been present since she was 38 weeks and has been worsening. Until the pre-eclampsia, this pregnancy has been uncomplicated and her baby is in good health.    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    In ED, afebrile, HR , /104, RR 20, SpO2 100% on RA    PMHx:   Pre-eclampsia    Asthma    PSHx:    23    Allergies:  No Known Allergies    Home Medications:  ASA 81    Current Medications:   acetaminophen     Tablet .. 975 milliGRAM(s) Oral once  furosemide   Injectable 40 milliGRAM(s) IV Push Once      FAMILY HISTORY:  HTN    Social History:  Smoking History: Denies  Alcohol Use:Denies  Drug Use: Denies    REVIEW OF SYSTEMS:  CONSTITUTIONAL: No weakness, fevers or chills  RESPIRATORY: No cough, wheezing, hemoptysis; No shortness of breath  CARDIOVASCULAR: No chest pain or palpitations; +lower extremity edema  HEME: No bleeding  GENITOURINARY: No dysuria, frequency or hematuria  NEUROLOGICAL: No numbness or weakness. +HA  SKIN: No itching, burning, rashes, or lesions   All other review of systems is negative unless indicated above.    Physical Exam:  T(F): 98.6 (12-19), Max: 98.6 (12-19)  HR: 92 (12-19) (92 - 100)  BP: 146/83 (12-19) (135/83 - 170/104)  RR: 18 (12-19)  SpO2: 100% (12-19)  GENERAL: Uncomfortable appearing woman sitting upright  HEAD:  Atraumatic, Normocephalic  ENT: EOMI, PERRLA, conjunctiva and sclera clear,+ JVD, moist mucosa  CHEST/LUNG: Faint crackles bilaterally; No wheeze, equal breath sounds bilaterally. Breathing is labored on room air  HEART: Regular rate and rhythm;  Brief, late-peaking 2/6 murmur in LUSB.  EXTREMITIES:  3+ BLE edema  PSYCH: Nl behavior, nl affect  NEUROLOGY: AAOx3, non-focal, cranial nerves intact  SKIN: Normal color, No rashes or lesions    LINES:    Cardiovascular Diagnostic Testing:    ECG: Personally reviewed:  Sinus rhythm HR 90, no evidence of ischemia    Echo: Personally reviewed:    Stress Testing:    Cath:    Imaging:    CXR: Personally reviewed  < from: Xray Chest 1 View AP/PA (23 @ 17:27) >  INTERPRETATION:  Small layering right and trace left pleural effusions.    < end of copied text >      Labs: Personally reviewed                        7.9    9.15  )-----------( 377      ( 19 Dec 2023 15:25 )             26.9     12-    138  |  106  |  10  ----------------------------<  91  4.1   |  22  |  0.64    Ca    8.6      19 Dec 2023 15:25  Mg     2.0     12-    TPro  6.2  /  Alb  3.5  /  TBili  0.2  /  DBili  <0.1  /  AST  46<H>  /  ALT  34  /  AlkPhos  115  12-    PT/INR - ( 19 Dec 2023 15:25 )   PT: 10.4 sec;   INR: 0.94 ratio         PTT - ( 19 Dec 2023 15:25 )  PTT:29.5 sec    CARDIAC MARKERS ( 19 Dec 2023 15:25 )  17 ng/L / x     / x     / x     / x     / x        Pro-Brain Natriuretic Peptide: 798 pg/mL (23 @ 15:25)

## 2023-12-20 LAB
ALBUMIN SERPL ELPH-MCNC: 2.9 G/DL — LOW (ref 3.3–5)
ALBUMIN SERPL ELPH-MCNC: 2.9 G/DL — LOW (ref 3.3–5)
ALP SERPL-CCNC: 97 U/L — SIGNIFICANT CHANGE UP (ref 40–120)
ALP SERPL-CCNC: 97 U/L — SIGNIFICANT CHANGE UP (ref 40–120)
ALT FLD-CCNC: 31 U/L — SIGNIFICANT CHANGE UP (ref 10–45)
ALT FLD-CCNC: 31 U/L — SIGNIFICANT CHANGE UP (ref 10–45)
ANION GAP SERPL CALC-SCNC: 11 MMOL/L — SIGNIFICANT CHANGE UP (ref 5–17)
ANION GAP SERPL CALC-SCNC: 11 MMOL/L — SIGNIFICANT CHANGE UP (ref 5–17)
APTT BLD: 29 SEC — SIGNIFICANT CHANGE UP (ref 24.5–35.6)
APTT BLD: 29 SEC — SIGNIFICANT CHANGE UP (ref 24.5–35.6)
AST SERPL-CCNC: 40 U/L — SIGNIFICANT CHANGE UP (ref 10–40)
AST SERPL-CCNC: 40 U/L — SIGNIFICANT CHANGE UP (ref 10–40)
BASOPHILS # BLD AUTO: 0.02 K/UL — SIGNIFICANT CHANGE UP (ref 0–0.2)
BASOPHILS # BLD AUTO: 0.02 K/UL — SIGNIFICANT CHANGE UP (ref 0–0.2)
BASOPHILS NFR BLD AUTO: 0.4 % — SIGNIFICANT CHANGE UP (ref 0–2)
BASOPHILS NFR BLD AUTO: 0.4 % — SIGNIFICANT CHANGE UP (ref 0–2)
BILIRUB SERPL-MCNC: 0.2 MG/DL — SIGNIFICANT CHANGE UP (ref 0.2–1.2)
BILIRUB SERPL-MCNC: 0.2 MG/DL — SIGNIFICANT CHANGE UP (ref 0.2–1.2)
BUN SERPL-MCNC: 10 MG/DL — SIGNIFICANT CHANGE UP (ref 7–23)
BUN SERPL-MCNC: 10 MG/DL — SIGNIFICANT CHANGE UP (ref 7–23)
CALCIUM SERPL-MCNC: 8.1 MG/DL — LOW (ref 8.4–10.5)
CALCIUM SERPL-MCNC: 8.1 MG/DL — LOW (ref 8.4–10.5)
CHLORIDE SERPL-SCNC: 109 MMOL/L — HIGH (ref 96–108)
CHLORIDE SERPL-SCNC: 109 MMOL/L — HIGH (ref 96–108)
CO2 SERPL-SCNC: 22 MMOL/L — SIGNIFICANT CHANGE UP (ref 22–31)
CO2 SERPL-SCNC: 22 MMOL/L — SIGNIFICANT CHANGE UP (ref 22–31)
CREAT SERPL-MCNC: 0.69 MG/DL — SIGNIFICANT CHANGE UP (ref 0.5–1.3)
CREAT SERPL-MCNC: 0.69 MG/DL — SIGNIFICANT CHANGE UP (ref 0.5–1.3)
EGFR: 110 ML/MIN/1.73M2 — SIGNIFICANT CHANGE UP
EGFR: 110 ML/MIN/1.73M2 — SIGNIFICANT CHANGE UP
EOSINOPHIL # BLD AUTO: 0.07 K/UL — SIGNIFICANT CHANGE UP (ref 0–0.5)
EOSINOPHIL # BLD AUTO: 0.07 K/UL — SIGNIFICANT CHANGE UP (ref 0–0.5)
EOSINOPHIL NFR BLD AUTO: 1.3 % — SIGNIFICANT CHANGE UP (ref 0–6)
EOSINOPHIL NFR BLD AUTO: 1.3 % — SIGNIFICANT CHANGE UP (ref 0–6)
FIBRINOGEN PPP-MCNC: 524 MG/DL — HIGH (ref 200–445)
FIBRINOGEN PPP-MCNC: 524 MG/DL — HIGH (ref 200–445)
GLUCOSE SERPL-MCNC: 91 MG/DL — SIGNIFICANT CHANGE UP (ref 70–99)
GLUCOSE SERPL-MCNC: 91 MG/DL — SIGNIFICANT CHANGE UP (ref 70–99)
HCT VFR BLD CALC: 23.1 % — LOW (ref 34.5–45)
HCT VFR BLD CALC: 23.1 % — LOW (ref 34.5–45)
HGB BLD-MCNC: 7 G/DL — CRITICAL LOW (ref 11.5–15.5)
HGB BLD-MCNC: 7 G/DL — CRITICAL LOW (ref 11.5–15.5)
IMM GRANULOCYTES NFR BLD AUTO: 0.7 % — SIGNIFICANT CHANGE UP (ref 0–0.9)
IMM GRANULOCYTES NFR BLD AUTO: 0.7 % — SIGNIFICANT CHANGE UP (ref 0–0.9)
INR BLD: 0.94 RATIO — SIGNIFICANT CHANGE UP (ref 0.85–1.18)
INR BLD: 0.94 RATIO — SIGNIFICANT CHANGE UP (ref 0.85–1.18)
LDH SERPL L TO P-CCNC: 367 U/L — HIGH (ref 50–242)
LDH SERPL L TO P-CCNC: 367 U/L — HIGH (ref 50–242)
LYMPHOCYTES # BLD AUTO: 0.82 K/UL — LOW (ref 1–3.3)
LYMPHOCYTES # BLD AUTO: 0.82 K/UL — LOW (ref 1–3.3)
LYMPHOCYTES # BLD AUTO: 14.7 % — SIGNIFICANT CHANGE UP (ref 13–44)
LYMPHOCYTES # BLD AUTO: 14.7 % — SIGNIFICANT CHANGE UP (ref 13–44)
MCHC RBC-ENTMCNC: 23 PG — LOW (ref 27–34)
MCHC RBC-ENTMCNC: 23 PG — LOW (ref 27–34)
MCHC RBC-ENTMCNC: 30.3 GM/DL — LOW (ref 32–36)
MCHC RBC-ENTMCNC: 30.3 GM/DL — LOW (ref 32–36)
MCV RBC AUTO: 76 FL — LOW (ref 80–100)
MCV RBC AUTO: 76 FL — LOW (ref 80–100)
MONOCYTES # BLD AUTO: 0.76 K/UL — SIGNIFICANT CHANGE UP (ref 0–0.9)
MONOCYTES # BLD AUTO: 0.76 K/UL — SIGNIFICANT CHANGE UP (ref 0–0.9)
MONOCYTES NFR BLD AUTO: 13.6 % — SIGNIFICANT CHANGE UP (ref 2–14)
MONOCYTES NFR BLD AUTO: 13.6 % — SIGNIFICANT CHANGE UP (ref 2–14)
NEUTROPHILS # BLD AUTO: 3.88 K/UL — SIGNIFICANT CHANGE UP (ref 1.8–7.4)
NEUTROPHILS # BLD AUTO: 3.88 K/UL — SIGNIFICANT CHANGE UP (ref 1.8–7.4)
NEUTROPHILS NFR BLD AUTO: 69.3 % — SIGNIFICANT CHANGE UP (ref 43–77)
NEUTROPHILS NFR BLD AUTO: 69.3 % — SIGNIFICANT CHANGE UP (ref 43–77)
NRBC # BLD: 1 /100 WBCS — HIGH (ref 0–0)
NRBC # BLD: 1 /100 WBCS — HIGH (ref 0–0)
PLATELET # BLD AUTO: 313 K/UL — SIGNIFICANT CHANGE UP (ref 150–400)
PLATELET # BLD AUTO: 313 K/UL — SIGNIFICANT CHANGE UP (ref 150–400)
POTASSIUM SERPL-MCNC: 3.8 MMOL/L — SIGNIFICANT CHANGE UP (ref 3.5–5.3)
POTASSIUM SERPL-MCNC: 3.8 MMOL/L — SIGNIFICANT CHANGE UP (ref 3.5–5.3)
POTASSIUM SERPL-SCNC: 3.8 MMOL/L — SIGNIFICANT CHANGE UP (ref 3.5–5.3)
POTASSIUM SERPL-SCNC: 3.8 MMOL/L — SIGNIFICANT CHANGE UP (ref 3.5–5.3)
PROT SERPL-MCNC: 5.4 G/DL — LOW (ref 6–8.3)
PROT SERPL-MCNC: 5.4 G/DL — LOW (ref 6–8.3)
PROTHROM AB SERPL-ACNC: 10.4 SEC — SIGNIFICANT CHANGE UP (ref 9.5–13)
PROTHROM AB SERPL-ACNC: 10.4 SEC — SIGNIFICANT CHANGE UP (ref 9.5–13)
RBC # BLD: 3.04 M/UL — LOW (ref 3.8–5.2)
RBC # BLD: 3.04 M/UL — LOW (ref 3.8–5.2)
RBC # FLD: 17.2 % — HIGH (ref 10.3–14.5)
RBC # FLD: 17.2 % — HIGH (ref 10.3–14.5)
SODIUM SERPL-SCNC: 142 MMOL/L — SIGNIFICANT CHANGE UP (ref 135–145)
SODIUM SERPL-SCNC: 142 MMOL/L — SIGNIFICANT CHANGE UP (ref 135–145)
T PALLIDUM AB TITR SER: NEGATIVE — SIGNIFICANT CHANGE UP
T PALLIDUM AB TITR SER: NEGATIVE — SIGNIFICANT CHANGE UP
URATE SERPL-MCNC: 5 MG/DL — SIGNIFICANT CHANGE UP (ref 2.5–7)
URATE SERPL-MCNC: 5 MG/DL — SIGNIFICANT CHANGE UP (ref 2.5–7)
WBC # BLD: 5.59 K/UL — SIGNIFICANT CHANGE UP (ref 3.8–10.5)
WBC # BLD: 5.59 K/UL — SIGNIFICANT CHANGE UP (ref 3.8–10.5)
WBC # FLD AUTO: 5.59 K/UL — SIGNIFICANT CHANGE UP (ref 3.8–10.5)
WBC # FLD AUTO: 5.59 K/UL — SIGNIFICANT CHANGE UP (ref 3.8–10.5)

## 2023-12-20 PROCEDURE — 99222 1ST HOSP IP/OBS MODERATE 55: CPT

## 2023-12-20 PROCEDURE — 99232 SBSQ HOSP IP/OBS MODERATE 35: CPT

## 2023-12-20 PROCEDURE — 99223 1ST HOSP IP/OBS HIGH 75: CPT

## 2023-12-20 PROCEDURE — 99221 1ST HOSP IP/OBS SF/LOW 40: CPT

## 2023-12-20 RX ORDER — ASCORBIC ACID 60 MG
500 TABLET,CHEWABLE ORAL THREE TIMES A DAY
Refills: 0 | Status: DISCONTINUED | OUTPATIENT
Start: 2023-12-20 | End: 2023-12-21

## 2023-12-20 RX ORDER — NIFEDIPINE 30 MG
30 TABLET, EXTENDED RELEASE 24 HR ORAL ONCE
Refills: 0 | Status: COMPLETED | OUTPATIENT
Start: 2023-12-20 | End: 2023-12-20

## 2023-12-20 RX ORDER — NIFEDIPINE 30 MG
60 TABLET, EXTENDED RELEASE 24 HR ORAL DAILY
Refills: 0 | Status: DISCONTINUED | OUTPATIENT
Start: 2023-12-21 | End: 2023-12-21

## 2023-12-20 RX ORDER — FERROUS SULFATE 325(65) MG
325 TABLET ORAL THREE TIMES A DAY
Refills: 0 | Status: DISCONTINUED | OUTPATIENT
Start: 2023-12-20 | End: 2023-12-21

## 2023-12-20 RX ORDER — ACETAMINOPHEN 500 MG
975 TABLET ORAL EVERY 6 HOURS
Refills: 0 | Status: DISCONTINUED | OUTPATIENT
Start: 2023-12-20 | End: 2023-12-21

## 2023-12-20 RX ADMIN — HEPARIN SODIUM 5000 UNIT(S): 5000 INJECTION INTRAVENOUS; SUBCUTANEOUS at 05:22

## 2023-12-20 RX ADMIN — Medication 500 MILLIGRAM(S): at 14:35

## 2023-12-20 RX ADMIN — Medication 975 MILLIGRAM(S): at 15:30

## 2023-12-20 RX ADMIN — Medication 975 MILLIGRAM(S): at 22:05

## 2023-12-20 RX ADMIN — Medication 30 MILLIGRAM(S): at 10:10

## 2023-12-20 RX ADMIN — Medication 975 MILLIGRAM(S): at 14:32

## 2023-12-20 RX ADMIN — Medication 325 MILLIGRAM(S): at 14:35

## 2023-12-20 RX ADMIN — Medication 325 MILLIGRAM(S): at 22:13

## 2023-12-20 RX ADMIN — HEPARIN SODIUM 5000 UNIT(S): 5000 INJECTION INTRAVENOUS; SUBCUTANEOUS at 18:32

## 2023-12-20 RX ADMIN — Medication 500 MILLIGRAM(S): at 22:13

## 2023-12-20 RX ADMIN — Medication 30 MILLIGRAM(S): at 22:13

## 2023-12-20 NOTE — PROGRESS NOTE ADULT - SUBJECTIVE AND OBJECTIVE BOX
Postpartum Note,  Section  She is a  43y woman who is now post-operative day 7 admitted for post partum PEC from [facility]. Patient had a headache and elevated blood pressure. Patient had a negative head CT and CT angio     Subjective:  The patient feels well.  She is ambulating.   She is tolerating regular diet.  She denies nausea and vomiting.  She is voiding.  Her pain is controlled.  She reports normal postpartum bleeding.  She is breastfeeding.  She is formula feeding.    Physical exam:    Vital Signs Last 24 Hrs  T(C): 36.8 (20 Dec 2023 05:31), Max: 37.6 (19 Dec 2023 20:15)  T(F): 98.3 (20 Dec 2023 05:31), Max: 99.7 (19 Dec 2023 20:15)  HR: 97 (20 Dec 2023 05:) (80 - 100)  BP: 137/90 (20 Dec 2023 05:31) (135/83 - 170/104)  BP(mean): 102 (19 Dec 2023 23:00) (102 - 116)  RR: 18 (20 Dec 2023 05:31) (18 - 20)  SpO2: 99% (20 Dec 2023 05:31) (98% - 100%)    Parameters below as of 20 Dec 2023 05:31  Patient On (Oxygen Delivery Method): room air        Gen: NAD  Breast: Soft, nontender, not engorged.  Abdomen: Soft, nontender, no distension , firm uterine fundus at umbilicus.  Incision: Clean, dry, and intact with steri strips  Pelvic: Normal lochia noted  Ext: No calf tenderness    LABS:                        7.0    5.59  )-----------( 313      ( 20 Dec 2023 06:45 )             23.1                         7.9    9.15  )-----------( 377      ( 19 Dec 2023 15:25 )             26.9     Magnesium: 2.0 mg/dL ( @ 15:25)    23 @ 06:44      142  |  109<H>  |  10  ----------------------------<  91  3.8   |  22  |  0.69    23 @ 15:25      138  |  106  |  10  ----------------------------<  91  4.1   |  22  |  0.64        Ca    8.1<L>      20 Dec 2023 06:44  Ca    8.6      19 Dec 2023 15:25  Mg     2.0         TPro  5.4<L>  /  Alb  2.9<L>  /  TBili  0.2  /  DBili  x   /  AST  40  /  ALT  31  /  AlkPhos  97  23 @ 06:44  TPro  6.2  /  Alb  3.5  /  TBili  0.2  /  DBili  <0.1  /  AST  46<H>  /  ALT  34  /  AlkPhos  115  23 @ 15:25        Allergies    No Known Allergies    Intolerances      MEDICATIONS  (STANDING):  heparin   Injectable 5000 Unit(s) SubCutaneous every 12 hours  NIFEdipine XL 30 milliGRAM(s) Oral daily  NIFEdipine XL 30 milliGRAM(s) Oral once  prenatal multivitamin 1 Tablet(s) Oral daily    MEDICATIONS  (PRN):        Assessment and Plan  POD # s/p  section  Doing well.  Encourage ambulation.         Postpartum Note,  Section  She is a  43y woman who is now post-operative day 7 admitted for post partum PEC from [facility]. Patient had a headache and elevated blood pressure. Patient had a negative head CT and CT angio     Subjective:  The patient feels well.  She is ambulating.   She is tolerating regular diet.  She denies nausea and vomiting.  She is voiding.  Her pain is controlled.  She reports normal postpartum bleeding.  She is breastfeeding.  She is formula feeding.    Physical exam:    Vital Signs Last 24 Hrs  T(C): 36.8 (20 Dec 2023 05:31), Max: 37.6 (19 Dec 2023 20:15)  T(F): 98.3 (20 Dec 2023 05:31), Max: 99.7 (19 Dec 2023 20:15)  HR: 97 (20 Dec 2023 05:) (80 - 100)  BP: 137/90 (20 Dec 2023 05:31) (135/83 - 170/104)  BP(mean): 102 (19 Dec 2023 23:00) (102 - 116)  RR: 18 (20 Dec 2023 05:31) (18 - 20)  SpO2: 99% (20 Dec 2023 05:31) (98% - 100%)    Parameters below as of 20 Dec 2023 05:31  Patient On (Oxygen Delivery Method): room air        Gen: NAD  Breast: Soft, nontender, not engorged.  Abdomen: Soft, nontender, no distension , firm uterine fundus at umbilicus.  Incision: Clean, dry, and intact with steri strips  Pelvic: Normal lochia noted  Ext: No calf tenderness    LABS:                        7.0    5.59  )-----------( 313      ( 20 Dec 2023 06:45 )             23.1                         7.9    9.15  )-----------( 377      ( 19 Dec 2023 15:25 )             26.9     Magnesium: 2.0 mg/dL ( @ 15:25)    23 @ 06:44      142  |  109<H>  |  10  ----------------------------<  91  3.8   |  22  |  0.69    23 @ 15:25      138  |  106  |  10  ----------------------------<  91  4.1   |  22  |  0.64        Ca    8.1<L>      20 Dec 2023 06:44  Ca    8.6      19 Dec 2023 15:25  Mg     2.0         TPro  5.4<L>  /  Alb  2.9<L>  /  TBili  0.2  /  DBili  x   /  AST  40  /  ALT  31  /  AlkPhos  97  23 @ 06:44  TPro  6.2  /  Alb  3.5  /  TBili  0.2  /  DBili  <0.1  /  AST  46<H>  /  ALT  34  /  AlkPhos  115  23 @ 15:25        Allergies    No Known Allergies    Intolerances      MEDICATIONS  (STANDING):  heparin   Injectable 5000 Unit(s) SubCutaneous every 12 hours  NIFEdipine XL 30 milliGRAM(s) Oral daily  NIFEdipine XL 30 milliGRAM(s) Oral once  prenatal multivitamin 1 Tablet(s) Oral daily    MEDICATIONS  (PRN):        Assessment and Plan  POD # s/p  section  Doing well.  Encourage ambulation.

## 2023-12-20 NOTE — CONSULT NOTE ADULT - SUBJECTIVE AND OBJECTIVE BOX
HISTORY OF PRESENT ILLNESS:     Patient is a 43 year old   now POD#6 s/p primary  section for worsening preeclampsia with severe features presenting with headache and SOB for the past 2 days.     Pt reports she had a scheduled late term induction of labor on  at [facility], she then developed preeclampsia with severe features intrapartum, was started on IV Magnesium for seizure prophylaxis and antihypertensives. Mode of delivery was reportedly  () due to worsening preeclampsia, per patient. She was discharged on POD#3 without a plan for continuation of antihypertensives.     Pt reports onset of headache [place] morning, with shortness of breath on light exertion beginning yesterday. Also reports significant LE swelling that has been stable since 38wks gestation. Denies vision changes, RUQ pain, epigastric pain, nausea, vomiting. Of note, pt had been taking bASA for preeclampsia prevention in the setting of advanced maternal age, IVF pregnancy.          MEDICATIONS  (STANDING):  ascorbic acid 500 milliGRAM(s) Oral three times a day  ferrous    sulfate 325 milliGRAM(s) Oral three times a day  heparin   Injectable 5000 Unit(s) SubCutaneous every 12 hours  NIFEdipine XL 30 milliGRAM(s) Oral once  prenatal multivitamin 1 Tablet(s) Oral daily    MEDICATIONS  (PRN):  acetaminophen     Tablet .. 975 milliGRAM(s) Oral every 6 hours PRN Mild Pain (1 - 3)      PHYSICAL EXAM:    T(C): 36.7 (23 @ 13:09), Max: 37.6 (23 @ 20:15)  HR: 92 (23 @ 13:30) (80 - 100)  BP: 136/86 (23 @ 13:30) (135/83 - 170/104)  RR: 18 (23 @ 13:09) (18 - 20)  SpO2: 99% (23 @ 13:09) (98% - 100%)  I&O's Summary    19 Dec 2023 07:01  -  20 Dec 2023 07:00  --------------------------------------------------------  IN: 0 mL / OUT: 950 mL / NET: -950 mL        Appearance: Normal		  Cardiovascular: Normal S1 S2, No JVD, No murmurs, No edema  Respiratory: Lungs clear to auscultation	  Psychiatry: A & O x 3, Mood & affect appropriate  Gastrointestinal:  Soft, Non-tender, + BS		  Neurologic: Non-focal  Extremities: Normal range of motion, No clubbing, cyanosis or edema  Vascular: Peripheral pulses palpable 2+ bilaterally    TELEMETRY: 	    ECG:  	  RADIOLOGY:  OTHER: 	  	  LABS:	 	                                    7.0    5.59  )-----------( 313      ( 20 Dec 2023 06:45 )             23.1     12-20    142  |  109<H>  |  10  ----------------------------<  91  3.8   |  22  |  0.69    Ca    8.1<L>      20 Dec 2023 06:44  Mg     2.0     12-19    TPro  5.4<L>  /  Alb  2.9<L>  /  TBili  0.2  /  DBili  x   /  AST  40  /  ALT  31  /  AlkPhos  97  12-20    proBNP:   HgA1c:   TSH:  	           HISTORY OF PRESENT ILLNESS:     Patient is a 43 year old   now POD#6 s/p primary  section for worsening preeclampsia with severe features presenting with headache and SOB for the past 2 days.     Pt reports she had a scheduled late term induction of labor on  at [facility], she then developed preeclampsia with severe features intrapartum, was started on IV Magnesium for seizure prophylaxis and antihypertensives. Mode of delivery was reportedly  () due to worsening preeclampsia, per patient. She was discharged on POD#3 without a plan for continuation of antihypertensives.     Pt reports onset of headache [place] morning, with shortness of breath on light exertion beginning yesterday. Also reports significant LE swelling that has been stable since 38wks gestation. Denies vision changes, RUQ pain, epigastric pain, nausea, vomiting. Of note, pt had been taking bASA for preeclampsia prevention in the setting of advanced maternal age, IVF pregnancy.          MEDICATIONS  (STANDING):  ascorbic acid 500 milliGRAM(s) Oral three times a day  ferrous    sulfate 325 milliGRAM(s) Oral three times a day  heparin   Injectable 5000 Unit(s) SubCutaneous every 12 hours  NIFEdipine XL 30 milliGRAM(s) Oral once  prenatal multivitamin 1 Tablet(s) Oral daily    MEDICATIONS  (PRN):  acetaminophen     Tablet .. 975 milliGRAM(s) Oral every 6 hours PRN Mild Pain (1 - 3)      PHYSICAL EXAM:    T(C): 36.7 (23 @ 13:09), Max: 37.6 (23 @ 20:15)  HR: 92 (23 @ 13:30) (80 - 100)  BP: 136/86 (23 @ 13:30) (135/83 - 170/104)  RR: 18 (23 @ 13:09) (18 - 20)  SpO2: 99% (23 @ 13:09) (98% - 100%)  I&O's Summary    19 Dec 2023 07:01  -  20 Dec 2023 07:00  --------------------------------------------------------  IN: 0 mL / OUT: 950 mL / NET: -950 mL        Appearance: Normal		  Cardiovascular: Normal S1 S2, No JVD, No murmurs, No edema  Respiratory: Lungs clear to auscultation	  Psychiatry: A & O x 3, Mood & affect appropriate  Gastrointestinal:  Soft, Non-tender, + BS		  Neurologic: Non-focal  Extremities: Normal range of motion, No clubbing, cyanosis or edema  Vascular: Peripheral pulses palpable 2+ bilaterally    TELEMETRY: 	    ECG:  	  RADIOLOGY:  OTHER: 	  	  LABS:	 	                                    7.0    5.59  )-----------( 313      ( 20 Dec 2023 06:45 )             23.1     12-20    142  |  109<H>  |  10  ----------------------------<  91  3.8   |  22  |  0.69    Ca    8.1<L>      20 Dec 2023 06:44  Mg     2.0     12-19    TPro  5.4<L>  /  Alb  2.9<L>  /  TBili  0.2  /  DBili  x   /  AST  40  /  ALT  31  /  AlkPhos  97  12-20    proBNP:   HgA1c:   TSH:  	           HISTORY OF PRESENT ILLNESS:     Patient is a 43 year old   now POD#7 s/p primary  section for worsening preeclampsia with severe features presenting with headache and SOB for the past 2 days.     Pt reports she had a scheduled late term induction of labor on  at [facility], she then developed preeclampsia with severe features intrapartum, was started on IV Magnesium for seizure prophylaxis and antihypertensives. Mode of delivery was reportedly  () due to worsening preeclampsia, per patient. She was discharged on POD#3 without a plan for continuation of antihypertensives.     Pt reports onset of headache  morning, 2023 with shortness of breath on light exertion. Also reported on admission yesterday significant LE swelling that has been stable since 38wks gestation. Of note, pt Patient had been taking baby ASA for preeclampsia prevention in the setting of advanced maternal age, IVF pregnancy.          MEDICATIONS  (STANDING):  ascorbic acid 500 milliGRAM(s) Oral three times a day  ferrous    sulfate 325 milliGRAM(s) Oral three times a day  heparin   Injectable 5000 Unit(s) SubCutaneous every 12 hours  NIFEdipine XL 30 milliGRAM(s) Oral once  prenatal multivitamin 1 Tablet(s) Oral daily    MEDICATIONS  (PRN):  acetaminophen     Tablet .. 975 milliGRAM(s) Oral every 6 hours PRN Mild Pain (1 - 3)      PHYSICAL EXAM:    T(C): 36.7 (23 @ 13:09), Max: 37.6 (23 @ 20:15)  HR: 92 (23 @ 13:30) (80 - 100)  BP: 136/86 (23 @ 13:30) (135/83 - 170/104)  RR: 18 (23 @ 13:09) (18 - 20)  SpO2: 99% (23 @ 13:09) (98% - 100%)  I&O's Summary    19 Dec 2023 07:01  -  20 Dec 2023 07:00  --------------------------------------------------------  IN: 0 mL / OUT: 950 mL / NET: -950 mL        	  LABS:	 	                         7.0    5.59  )-----------( 313      ( 20 Dec 2023 06:45 )             23.1         142  |  109<H>  |  10  ----------------------------<  91  3.8   |  22  |  0.69    Ca    8.1<L>      20 Dec 2023 06:44  Mg     2.0         TPro  5.4<L>  /  Alb  2.9<L>  /  TBili  0.2  /  DBili  x   /  AST  40  /  ALT  31  /  AlkPhos  97             HISTORY OF PRESENT ILLNESS:     Patient is a 43 year old   now POD#7 s/p primary  section for worsening preeclampsia with severe features presenting with headache and SOB for the past 2 days.     Pt reports she had a scheduled late term induction of labor on  at [facility], she then developed preeclampsia with severe features intrapartum, was started on IV Magnesium for seizure prophylaxis and antihypertensives. Mode of delivery was reportedly  () due to worsening preeclampsia, per patient. She was discharged on POD#3 without a plan for continuation of antihypertensives.     Pt reports onset of headache  morning, 2023 with shortness of breath on light exertion. Also reported on admission yesterday significant LE swelling that has been stable since 38wks gestation. Of note, pt Patient had been taking baby ASA for preeclampsia prevention in the setting of advanced maternal age, IVF pregnancy.          MEDICATIONS  (STANDING):  ascorbic acid 500 milliGRAM(s) Oral three times a day  ferrous    sulfate 325 milliGRAM(s) Oral three times a day  heparin   Injectable 5000 Unit(s) SubCutaneous every 12 hours  NIFEdipine XL 30 milliGRAM(s) Oral once  prenatal multivitamin 1 Tablet(s) Oral daily    MEDICATIONS  (PRN):  acetaminophen     Tablet .. 975 milliGRAM(s) Oral every 6 hours PRN Mild Pain (1 - 3)      PHYSICAL EXAM:    T(C): 36.7 (23 @ 13:09), Max: 37.6 (23 @ 20:15)  HR: 92 (23 @ 13:30) (80 - 100)  BP: 136/86 (23 @ 13:30) (135/83 - 170/104)  RR: 18 (23 @ 13:09) (18 - 20)  SpO2: 99% (23 @ 13:09) (98% - 100%)  I&O's Summary    19 Dec 2023 07:01  -  20 Dec 2023 07:00  --------------------------------------------------------  IN: 0 mL / OUT: 950 mL / NET: -950 mL        	  LABS:	 	                         7.0    5.59  )-----------( 313      ( 20 Dec 2023 06:45 )             23.1         142  |  109<H>  |  10  ----------------------------<  91  3.8   |  22  |  0.69    Ca    8.1<L>      20 Dec 2023 06:44  Mg     2.0         TPro  5.4<L>  /  Alb  2.9<L>  /  TBili  0.2  /  DBili  x   /  AST  40  /  ALT  31  /  AlkPhos  97             HISTORY OF PRESENT ILLNESS:     Patient is a 43 year old   now POD#7 s/p primary  section for worsening preeclampsia with severe features presenting with headache and SOB for the past 2 days.     Pt reports she had a scheduled late term induction of labor on  at [facility], she then developed preeclampsia with severe features intrapartum, was started on IV Magnesium for seizure prophylaxis and antihypertensives. Mode of delivery was reportedly  () due to worsening preeclampsia, per patient. She was discharged on POD#3 without a plan for continuation of antihypertensives.     Pt reports onset of headache  morning, 2023 with shortness of breath on light exertion. Also reported on admission yesterday significant LE swelling that has been stable since 38wks gestation. Of note, pt Patient had been taking baby ASA for preeclampsia prevention in the setting of advanced maternal age, IVF pregnancy.    Now, with ongoing elevation in blood pressures.           MEDICATIONS  (STANDING):  ascorbic acid 500 milliGRAM(s) Oral three times a day  ferrous    sulfate 325 milliGRAM(s) Oral three times a day  heparin   Injectable 5000 Unit(s) SubCutaneous every 12 hours  NIFEdipine XL 30 milliGRAM(s) Oral once  prenatal multivitamin 1 Tablet(s) Oral daily    MEDICATIONS  (PRN):  acetaminophen     Tablet .. 975 milliGRAM(s) Oral every 6 hours PRN Mild Pain (1 - 3)      PHYSICAL EXAM:    T(C): 36.7 (23 @ 13:09), Max: 37.6 (23 @ 20:15)  HR: 92 (23 @ 13:30) (80 - 100)  BP: 136/86 (23 @ 13:30) (135/83 - 170/104)  RR: 18 (23 @ 13:09) (18 - 20)  SpO2: 99% (23 @ 13:09) (98% - 100%)  I&O's Summary    19 Dec 2023 07:01  -  20 Dec 2023 07:00  --------------------------------------------------------  IN: 0 mL / OUT: 950 mL / NET: -950 mL        	  LABS:	 	                         7.0    5.59  )-----------( 313      ( 20 Dec 2023 06:45 )             23.1         142  |  109<H>  |  10  ----------------------------<  91  3.8   |  22  |  0.69    Ca    8.1<L>      20 Dec 2023 06:44  Mg     2.0         TPro  5.4<L>  /  Alb  2.9<L>  /  TBili  0.2  /  DBili  x   /  AST  40  /  ALT  31  /  AlkPhos  97             HISTORY OF PRESENT ILLNESS:     Patient is a 43 year old   now POD#7 s/p primary  section for worsening preeclampsia with severe features presenting with headache and SOB for the past 2 days.     Pt reports she had a scheduled late term induction of labor on  at [facility], she then developed preeclampsia with severe features intrapartum, was started on IV Magnesium for seizure prophylaxis and antihypertensives. Mode of delivery was reportedly  () due to worsening preeclampsia, per patient. She was discharged on POD#3 without a plan for continuation of antihypertensives.     Pt reports onset of headache  morning, 2023 with shortness of breath on light exertion. Also reported on admission yesterday significant LE swelling that has been stable since 38wks gestation. Of note, pt Patient had been taking baby ASA for preeclampsia prevention in the setting of advanced maternal age, IVF pregnancy.    Now, with ongoing elevation in blood pressures.           MEDICATIONS  (STANDING):  ascorbic acid 500 milliGRAM(s) Oral three times a day  ferrous    sulfate 325 milliGRAM(s) Oral three times a day  heparin   Injectable 5000 Unit(s) SubCutaneous every 12 hours  NIFEdipine XL 30 milliGRAM(s) Oral once  prenatal multivitamin 1 Tablet(s) Oral daily    MEDICATIONS  (PRN):  acetaminophen     Tablet .. 975 milliGRAM(s) Oral every 6 hours PRN Mild Pain (1 - 3)      PHYSICAL EXAM:    T(C): 36.7 (23 @ 13:09), Max: 37.6 (23 @ 20:15)  HR: 92 (23 @ 13:30) (80 - 100)  BP: 136/86 (23 @ 13:30) (135/83 - 170/104)  RR: 18 (23 @ 13:09) (18 - 20)  SpO2: 99% (23 @ 13:09) (98% - 100%)  I&O's Summary    19 Dec 2023 07:01  -  20 Dec 2023 07:00  --------------------------------------------------------  IN: 0 mL / OUT: 950 mL / NET: -950 mL        	  LABS:	 	                         7.0    5.59  )-----------( 313      ( 20 Dec 2023 06:45 )             23.1         142  |  109<H>  |  10  ----------------------------<  91  3.8   |  22  |  0.69    Ca    8.1<L>      20 Dec 2023 06:44  Mg     2.0         TPro  5.4<L>  /  Alb  2.9<L>  /  TBili  0.2  /  DBili  x   /  AST  40  /  ALT  31  /  AlkPhos  97             HISTORY OF PRESENT ILLNESS:     Patient is a 43 year old   now POD#7 s/p primary  section for worsening preeclampsia with severe features presenting with headache and SOB for the past 2 days.     Pt reports she had a scheduled late term induction of labor on  at [facility], she then developed preeclampsia with severe features intrapartum, was started on IV Magnesium for seizure prophylaxis and antihypertensives. Mode of delivery was reportedly  () due to worsening preeclampsia, per patient. She was discharged on POD#3 without a plan for continuation of antihypertensives.     Pt reports onset of headache  morning, 2023 with shortness of breath on light exertion. Also reported on admission yesterday significant LE swelling that has been stable since 38wks gestation. Of note, pt Patient had been taking baby ASA for preeclampsia prevention in the setting of advanced maternal age, IVF pregnancy.    Now, with ongoing elevation in blood pressures.           MEDICATIONS  (STANDING):  ascorbic acid 500 milliGRAM(s) Oral three times a day  ferrous    sulfate 325 milliGRAM(s) Oral three times a day  heparin   Injectable 5000 Unit(s) SubCutaneous every 12 hours  NIFEdipine XL 30 milliGRAM(s) Oral once  prenatal multivitamin 1 Tablet(s) Oral daily    MEDICATIONS  (PRN):  acetaminophen     Tablet .. 975 milliGRAM(s) Oral every 6 hours PRN Mild Pain (1 - 3)      PHYSICAL EXAM:    T(C): 36.7 (23 @ 13:09), Max: 37.6 (23 @ 20:15)  HR: 92 (23 @ 13:30) (80 - 100)  BP: 136/86 (23 @ 13:30) (135/83 - 170/104)  RR: 18 (23 @ 13:09) (18 - 20)  SpO2: 99% (23 @ 13:09) (98% - 100%)  I&O's Summary    19 Dec 2023 07:01  -  20 Dec 2023 07:00  --------------------------------------------------------  IN: 0 mL / OUT: 950 mL / NET: -950 mL    DIAGNOSTIC  2023  LV cavity is normal LV wall thickness is normal  LV systolic function is normal   LVEF 64%  No regional wall motion abnormalities  RV cavity is normal size and function  No valvular abnormalities        	  LABS:	 	                         7.0    5.59  )-----------( 313      ( 20 Dec 2023 06:45 )             23.1         142  |  109<H>  |  10  ----------------------------<  91  3.8   |  22  |  0.69    Ca    8.1<L>      20 Dec 2023 06:44  Mg     2.0         TPro  5.4<L>  /  Alb  2.9<L>  /  TBili  0.2  /  DBili  x   /  AST  40  /  ALT  31  /  AlkPhos  97             HISTORY OF PRESENT ILLNESS:     Patient is a 43 year old   now POD#7 s/p primary  section for worsening preeclampsia with severe features presenting with headache and SOB for the past 2 days.     Pt reports she had a scheduled late term induction of labor on  at [facility], she then developed preeclampsia with severe features intrapartum, was started on IV Magnesium for seizure prophylaxis and antihypertensives. Mode of delivery was reportedly  () due to worsening preeclampsia, per patient. She was discharged on POD#3 without a plan for continuation of antihypertensives.     Pt reports onset of headache  morning, 2023 with shortness of breath on light exertion. Also reported on admission yesterday significant LE swelling that has been stable since 38wks gestation. Of note, pt Patient had been taking baby ASA for preeclampsia prevention in the setting of advanced maternal age, IVF pregnancy.    Now, with ongoing elevation in blood pressures.           MEDICATIONS  (STANDING):  ascorbic acid 500 milliGRAM(s) Oral three times a day  ferrous    sulfate 325 milliGRAM(s) Oral three times a day  heparin   Injectable 5000 Unit(s) SubCutaneous every 12 hours  NIFEdipine XL 30 milliGRAM(s) Oral once  prenatal multivitamin 1 Tablet(s) Oral daily    MEDICATIONS  (PRN):  acetaminophen     Tablet .. 975 milliGRAM(s) Oral every 6 hours PRN Mild Pain (1 - 3)      PHYSICAL EXAM:    T(C): 36.7 (23 @ 13:09), Max: 37.6 (23 @ 20:15)  HR: 92 (23 @ 13:30) (80 - 100)  BP: 136/86 (23 @ 13:30) (135/83 - 170/104)  RR: 18 (23 @ 13:09) (18 - 20)  SpO2: 99% (23 @ 13:09) (98% - 100%)  I&O's Summary    19 Dec 2023 07:01  -  20 Dec 2023 07:00  --------------------------------------------------------  IN: 0 mL / OUT: 950 mL / NET: -950 mL    DIAGNOSTIC  2023  LV cavity is normal LV wall thickness is normal  LV systolic function is normal   LVEF 64%  No regional wall motion abnormalities  RV cavity is normal size and function  No valvular abnormalities        	  LABS:	 	                         7.0    5.59  )-----------( 313      ( 20 Dec 2023 06:45 )             23.1         142  |  109<H>  |  10  ----------------------------<  91  3.8   |  22  |  0.69    Ca    8.1<L>      20 Dec 2023 06:44  Mg     2.0         TPro  5.4<L>  /  Alb  2.9<L>  /  TBili  0.2  /  DBili  x   /  AST  40  /  ALT  31  /  AlkPhos  97

## 2023-12-20 NOTE — PROGRESS NOTE ADULT - ASSESSMENT
42 yo  s/p  on  complicated by pre-eclampsia during labor at [facility] presents with complaints of worsening shortness of breath, headache, and bilateral lower extremity edema, and elevated BPs concerning for pre-eclampsia, now ruled out elizabeth-partum cardiomyopathy.    Recommendations:  - TTE without evidence of PPCM  - CTA ruled out PE  - Can continue BP management per OB, currently Nifedipine 30mg, could increase to 60 as BPs still elevated  - Magnesium for pre-eclampsia per OB  - Would give another dose of Lasix 40mg IV x1 as she had improvement in breathing and swelling  - Referred to Cardio-OB  General cardiology will sign off at this time    ***Note not finalized until co-signed by attending***    Conrad Mckinney MD  Cardiology Fellow  All Cardiology service information can be found  on [email or web address], password: [email or web address]  44 yo  s/p  on  complicated by pre-eclampsia during labor at [facility] presents with complaints of worsening shortness of breath, headache, and bilateral lower extremity edema, and elevated BPs concerning for pre-eclampsia, now ruled out elizabeth-partum cardiomyopathy.    Recommendations:  - TTE without evidence of PPCM  - CTA ruled out PE  - Can continue BP management per OB, currently Nifedipine 30mg, could increase to 60 as BPs still elevated  - Magnesium for pre-eclampsia per OB  - Would give another dose of Lasix 40mg IV x1 as she had improvement in breathing and swelling  - Referred to Cardio-OB  General cardiology will sign off at this time    ***Note not finalized until co-signed by attending***    Conrad Mckinney MD  Cardiology Fellow  All Cardiology service information can be found  on [email or web address], password: [email or web address]

## 2023-12-20 NOTE — CONSULT NOTE ADULT - NS ATTEND AMEND GEN_ALL_CORE FT
Ms. Stock is postpartum day # 7, admitted with preeclampsia with severe features transferred from [facility]     #Gestational Hypertension/Preeclampsia    Blood pressures continue to be elevated-> most recent /89 repeat 136/ 86    She is on 30 mg of procardia which was started last night.     Her blood pressure is still elevated      OB added another 30 mg of procardia and start 60 mg in the AM     Will up titrate medication if necessary     Reviewed echocardiogram results-> normal LV function    Continue to monitor VS as per OB protocol    Cardio OB to follow upon discharge. Ms. Stock is postpartum day # 7, admitted with preeclampsia with severe features transferred from [facility]     #Gestational Hypertension/Preeclampsia    Blood pressures continue to be elevated-> most recent /89 repeat 136/ 86    She is on 30 mg of procardia which was started last night.     Her blood pressure is still elevated      OB added another 30 mg of procardia and start 60 mg in the AM     Will up titrate medication if necessary     Reviewed echocardiogram results-> normal LV function    Continue to monitor VS as per OB protocol    Cardio OB to follow upon discharge.

## 2023-12-20 NOTE — CHART NOTE - NSCHARTNOTEFT_GEN_A_CORE
Called to bedside by RN who states that patient has concerns of separation of her Pfannenstiel incision.    I evaluated the patient at bedside, who has a Purewick suction catheter in place for urine drainage after receiving dose of Lasix. Apparent dysfunction of Purewick leading to saturation of Pfannenstiel incision with urine. Patient is shaking, but denies fevers and chills-- states she is shivering due to being cold in recovery room. Denies bleeding or drainage from the incision.    T(C): 37 (19 Dec 2023 19:15), Max: 37 (19 Dec 2023 14:54)  T(F): 98.6 (19 Dec 2023 19:15), Max: 98.6 (19 Dec 2023 14:54)  HR: 89 (19 Dec 2023 20:15) (82 - 100)  BP: 149/90 (19 Dec 2023 20:15) (135/83 - 170/104)  BP(mean): 113 (19 Dec 2023 20:15) (110 - 113)  RR: 20 (19 Dec 2023 19:15) (18 - 20)  SpO2: 100% (19 Dec 2023 20:15) (98% - 100%)    Focused physical exam:  General appearance: NAD, shivering  Abdomen: Appropriately tender. No rebound or guarding. Fundus firm.  Incision: Pfannenstiel incision with loose blood stained steri strips, removed at bedside. Pfannenstiel incision with redundant thin strip of skin loosely attached at the right incision edge. No incisional swelling, erythema, purulence, or bleeding.   : Purewick in place. Lochia scant.    Assessment and plan:  POD#5 from .  Redundant skin trimmed with sterile scissors. Whole incision irrigated with 400cc warm saline. Steri strips reapplied to pfannenstiel.    Shauna Gutierrez, PGY-1
PA NOTE     POD#7      Vital Signs Last 24 Hrs  T(C): 36.8 (20 Dec 2023 05:31), Max: 37.6 (19 Dec 2023 20:15)  T(F): 98.3 (20 Dec 2023 05:31), Max: 99.7 (19 Dec 2023 20:15)  HR: 97 (20 Dec 2023 05:31) (80 - 100)  BP: 137/90 (20 Dec 2023 05:31) (135/83 - 170/104)  BP(mean): 102 (19 Dec 2023 23:00) (102 - 116)  RR: 18 (20 Dec 2023 05:31) (18 - 20)  SpO2: 99% (20 Dec 2023 05:31) (98% - 100%)    Parameters below as of 20 Dec 2023 05:31  Patient On (Oxygen Delivery Method): room air                   7.0    5.59  )-----------( 313      (  @ 06:45 )             23.1                7.9    9.15  )-----------( 377      ( 19 @ 15:25 )             26.9           Assessment: Anemia secondary to acute blood loss due to delivery    Plan:  - Ferrous Sulfate, Vitamin C supplementation.  - Monitor for signs/symptoms of anemia.   - Does not require transfusion at this time

## 2023-12-20 NOTE — PROGRESS NOTE ADULT - ASSESSMENT
Patient with PEC with severe features transferred from [place]  She is on 30 mg of procardia which was started last night.  Her blood pressure is still elevated this Am  She will be given another 30 mg of procardia and start 60 mg in the AM  She will get another 30 mg tonight for a total of 60 mg today   Patient with PEC with severe features transferred from [place]  She is on 30 mg of procardia which was started last night.  Her blood pressure is still elevated this Am  She will be given another 30 mg of procardia and start 60 mg in the AM  She will get another 30 mg tonight for a total of 60 mg today

## 2023-12-20 NOTE — CONSULT NOTE ADULT - ASSESSMENT
Ms. Stock is postpartum day # 7, admitted with preeclampsia with severe features transferred from [facility]     #Gestational Hypertension/Preeclampsia    Blood pressures continue to be elevated-> most recent   She is on 30 mg of procardia which was started last night.  Her blood pressure is still elevated this Am  She will be given another 30 mg of procardia and start 60 mg in the AM  She will get another 30 mg tonight for a total of 60 mg toda Ms. Stock is postpartum day # 7, admitted with preeclampsia with severe features transferred from [facility]     #Gestational Hypertension/Preeclampsia    Blood pressures continue to be elevated-> most recent   She is on 30 mg of procardia which was started last night.  Her blood pressure is still elevated this Am  She will be given another 30 mg of procardia and start 60 mg in the AM  She will get another 30 mg tonight for a total of 60 mg toda Ms. Stock is postpartum day # 7, admitted with preeclampsia with severe features transferred from [facility]     #Gestational Hypertension/Preeclampsia    Blood pressures continue to be elevated-> most recent /89 repeat 136/ 86    She is on 30 mg of procardia which was started last night.     Her blood pressure is still elevated      OB added another 30 mg of procardia and start 60 mg in the AM   Ms. Stock is postpartum day # 7, admitted with preeclampsia with severe features transferred from [facility]     #Gestational Hypertension/Preeclampsia    Blood pressures continue to be elevated-> most recent /89 repeat 136/ 86    She is on 30 mg of procardia which was started last night.     Her blood pressure is still elevated      OB added another 30 mg of procardia and start 60 mg in the AM   Ms. Stock is postpartum day # 7, admitted with preeclampsia with severe features transferred from [facility]     #Gestational Hypertension/Preeclampsia    Blood pressures continue to be elevated-> most recent /89 repeat 136/ 86    She is on 30 mg of procardia which was started last night.     Her blood pressure is still elevated      OB added another 30 mg of procardia and start 60 mg in the AM     Will up titrate medication if necessary     Reviewed echocardiogram results-> normal LV function    Continue to monitor VS as per OB protocol    Cardio OB to follow upon discharge.    Ms. Stock is postpartum day # 7, admitted with preeclampsia with severe features transferred from [facility]     #Gestational Hypertension/Preeclampsia    Blood pressures continue to be elevated-> most recent /89 repeat 136/ 86    She is on 30 mg of procardia which was started last night.     Her blood pressure is still elevated      OB added another 30 mg of procardia and start 60 mg in the AM     Will up titrate medication if necessary     Reviewed echocardiogram results-> normal LV function    Continue to monitor VS as per OB protocol    Cardio OB to follow upon discharge.

## 2023-12-21 ENCOUNTER — TRANSCRIPTION ENCOUNTER (OUTPATIENT)
Age: 43
End: 2023-12-21

## 2023-12-21 VITALS
TEMPERATURE: 98 F | DIASTOLIC BLOOD PRESSURE: 79 MMHG | OXYGEN SATURATION: 100 % | SYSTOLIC BLOOD PRESSURE: 117 MMHG | RESPIRATION RATE: 18 BRPM | HEART RATE: 95 BPM

## 2023-12-21 PROCEDURE — 83615 LACTATE (LD) (LDH) ENZYME: CPT

## 2023-12-21 PROCEDURE — 93356 MYOCRD STRAIN IMG SPCKL TRCK: CPT

## 2023-12-21 PROCEDURE — 80053 COMPREHEN METABOLIC PANEL: CPT

## 2023-12-21 PROCEDURE — 86780 TREPONEMA PALLIDUM: CPT

## 2023-12-21 PROCEDURE — 70450 CT HEAD/BRAIN W/O DYE: CPT | Mod: MA

## 2023-12-21 PROCEDURE — 80076 HEPATIC FUNCTION PANEL: CPT

## 2023-12-21 PROCEDURE — 82947 ASSAY GLUCOSE BLOOD QUANT: CPT

## 2023-12-21 PROCEDURE — 71275 CT ANGIOGRAPHY CHEST: CPT | Mod: MA

## 2023-12-21 PROCEDURE — 84550 ASSAY OF BLOOD/URIC ACID: CPT

## 2023-12-21 PROCEDURE — 85384 FIBRINOGEN ACTIVITY: CPT

## 2023-12-21 PROCEDURE — 36415 COLL VENOUS BLD VENIPUNCTURE: CPT

## 2023-12-21 PROCEDURE — 99291 CRITICAL CARE FIRST HOUR: CPT | Mod: 25

## 2023-12-21 PROCEDURE — 80048 BASIC METABOLIC PNL TOTAL CA: CPT

## 2023-12-21 PROCEDURE — 83880 ASSAY OF NATRIURETIC PEPTIDE: CPT

## 2023-12-21 PROCEDURE — 85025 COMPLETE CBC W/AUTO DIFF WBC: CPT

## 2023-12-21 PROCEDURE — 84295 ASSAY OF SERUM SODIUM: CPT

## 2023-12-21 PROCEDURE — 85730 THROMBOPLASTIN TIME PARTIAL: CPT

## 2023-12-21 PROCEDURE — 83735 ASSAY OF MAGNESIUM: CPT

## 2023-12-21 PROCEDURE — 99232 SBSQ HOSP IP/OBS MODERATE 35: CPT | Mod: GC

## 2023-12-21 PROCEDURE — 96374 THER/PROPH/DIAG INJ IV PUSH: CPT | Mod: XU

## 2023-12-21 PROCEDURE — 93005 ELECTROCARDIOGRAM TRACING: CPT

## 2023-12-21 PROCEDURE — 83605 ASSAY OF LACTIC ACID: CPT

## 2023-12-21 PROCEDURE — 84132 ASSAY OF SERUM POTASSIUM: CPT

## 2023-12-21 PROCEDURE — 93308 TTE F-UP OR LMTD: CPT

## 2023-12-21 PROCEDURE — 85018 HEMOGLOBIN: CPT

## 2023-12-21 PROCEDURE — 93306 TTE W/DOPPLER COMPLETE: CPT

## 2023-12-21 PROCEDURE — 82803 BLOOD GASES ANY COMBINATION: CPT

## 2023-12-21 PROCEDURE — 71045 X-RAY EXAM CHEST 1 VIEW: CPT

## 2023-12-21 PROCEDURE — 82330 ASSAY OF CALCIUM: CPT

## 2023-12-21 PROCEDURE — 99233 SBSQ HOSP IP/OBS HIGH 50: CPT

## 2023-12-21 PROCEDURE — 76376 3D RENDER W/INTRP POSTPROCES: CPT

## 2023-12-21 PROCEDURE — 82435 ASSAY OF BLOOD CHLORIDE: CPT

## 2023-12-21 PROCEDURE — 85610 PROTHROMBIN TIME: CPT

## 2023-12-21 PROCEDURE — 81001 URINALYSIS AUTO W/SCOPE: CPT

## 2023-12-21 PROCEDURE — 85014 HEMATOCRIT: CPT

## 2023-12-21 PROCEDURE — 84484 ASSAY OF TROPONIN QUANT: CPT

## 2023-12-21 RX ORDER — FERROUS SULFATE 325(65) MG
1 TABLET ORAL
Qty: 0 | Refills: 0 | DISCHARGE
Start: 2023-12-21

## 2023-12-21 RX ORDER — NIFEDIPINE 30 MG
1 TABLET, EXTENDED RELEASE 24 HR ORAL
Qty: 30 | Refills: 0
Start: 2023-12-21 | End: 2024-01-19

## 2023-12-21 RX ORDER — ACETAMINOPHEN 500 MG
3 TABLET ORAL
Qty: 0 | Refills: 0 | DISCHARGE
Start: 2023-12-21

## 2023-12-21 RX ADMIN — Medication 975 MILLIGRAM(S): at 18:14

## 2023-12-21 RX ADMIN — Medication 975 MILLIGRAM(S): at 09:27

## 2023-12-21 RX ADMIN — Medication 975 MILLIGRAM(S): at 10:15

## 2023-12-21 RX ADMIN — Medication 325 MILLIGRAM(S): at 17:24

## 2023-12-21 RX ADMIN — Medication 500 MILLIGRAM(S): at 17:24

## 2023-12-21 RX ADMIN — Medication 975 MILLIGRAM(S): at 17:25

## 2023-12-21 RX ADMIN — HEPARIN SODIUM 5000 UNIT(S): 5000 INJECTION INTRAVENOUS; SUBCUTANEOUS at 06:15

## 2023-12-21 RX ADMIN — Medication 60 MILLIGRAM(S): at 06:14

## 2023-12-21 NOTE — DISCHARGE NOTE OB - CARE PROVIDER_API CALL
[facility],   [address]   [address]  [place] [address]   [phone]  Phone: (   )    -  Fax: (   )    -  Scheduled Appointment: 12/26/2023 11:00 AM   [facility],   [address]   [address]  [place] [address]   [phone]  Phone: (   )    -  Fax: (   )    -  Scheduled Appointment: 12/26/2023 11:00 AM

## 2023-12-21 NOTE — DISCHARGE NOTE OB - HOSPITAL COURSE
Patient presented postpartum with headache and SOB. She underwent CTA to rule out PE and ECHO which was normal with an EF of 71%. Patient seen by cardiology who recommended Lasix and her symptoms improved. During hospitalization she saturated well on room air. She was started on blood pressure medication. Patient was discharged home in stable condition with normal vital signs. She will follow up with [facility] clinic in 1 week. Patient presented postpartum with headache and SOB. She underwent CTA to rule out PE and ECHO which was normal with an EF of 71%. Patient seen by cardiology who recommended Lasix and her symptoms improved. During hospitalization she saturated well on room air. She was started on blood pressure medication. Patient was discharged home in stable condition with normal vital signs. She will follow up with [facility] clinic in 1 week. Patient presented postpartum with headache, elevated Blood pressure and SOB--diagnosed w postpartum preeclampsia. She underwent CTA to rule out PE and ECHO which was normal with an EF of 71%. Patient seen by cardiology who recommended Lasix and her symptoms improved. During hospitalization she saturated well on room air. She was started on blood pressure medication. Patient was discharged home in stable condition with normal vital signs. She will follow up with [facility] clinic in 1 week.     Patient presented postpartum with headache, elevated Blood pressure and SOB--diagnosed w postpartum preeclampsia. She underwent CTA to rule out PE and ECHO which was normal with an EF of 71%. Patient seen by cardiology who recommended Lasix and her symptoms improved. During hospitalization she saturated well on room air. She was started on blood pressure medication. Patient was discharged home in stable condition with normal vital signs. She will follow up with [facility] clinic in 1 week.

## 2023-12-21 NOTE — DISCHARGE NOTE OB - PATIENT PORTAL LINK FT
You can access the FollowMyHealth Patient Portal offered by [facility] by registering at the following website: [email or web address]. By joining [employer]’s FollowMyHealth portal, you will also be able to view your health information using other applications (apps) compatible with our system. You can access the FollowMyHealth Patient Portal offered by [facility] by registering at the following website: [email or web address]. By joining [employer]’s FollowMyHealth portal, you will also be able to view your health information using other applications (apps) compatible with our system.

## 2023-12-21 NOTE — PROGRESS NOTE ADULT - ASSESSMENT
Ms. Stock is postpartum day # 8, admitted with preeclampsia with severe features transferred from [facility] - now improved BPs    #Gestational Hypertension/Preeclampsia    Blood pressures improved since last night -> most recent /79    Tolerating the higher dose of the procardia at 60 mg daily     Will up titrate medication if necessary     Reviewed echocardiogram results-> normal LV function with no significant valvular disease    Continue to monitor VS as per OB protocol    Cardio OB to follow upon discharge.    Ms. Stock is postpartum day # 8, admitted with preeclampsia with severe features transferred from [facility] - now improved BPs    #Gestational Hypertension/Preeclampsia    Blood pressures improved since last night -> most recent /79    Tolerating the higher dose of the procardia at 60 mg daily     Will up titrate medication if necessary     Reviewed echocardiogram results-> normal LV function with no significant valvular disease    Continue to monitor VS as per OB protocol    Cardio OB to follow upon discharge.

## 2023-12-21 NOTE — PROGRESS NOTE ADULT - SUBJECTIVE AND OBJECTIVE BOX
S/24 Events: Patient seen and examined. Denies CP, SOB, dizziness, LH, near syncope or sycope.   No acute events overnight.   Telemetry : -  O:  T(C): 36.8 (12-21-23 @ 13:00), Max: 37 (12-21-23 @ 05:44)  HR: 95 (12-21-23 @ 13:00) (91 - 105)  BP: 117/79 (12-21-23 @ 13:00) (116/69 - 143/87)  RR: 18 (12-21-23 @ 13:00) (18 - 18)  SpO2: 100% (12-21-23 @ 13:00) (99% - 100%)    Daily     Daily   Gen: NAD  HEENT: EOMI  CV: RRR, normal S1 + S2, no m/r/g  Lungs: CTAB  Abd: soft, non-tender  Ext: No edema    Labs:                        7.0    5.59  )-----------( 313      ( 20 Dec 2023 06:45 )             23.1     12-20    142  |  109<H>  |  10  ----------------------------<  91  3.8   |  22  |  0.69    Ca    8.1<L>      20 Dec 2023 06:44  Mg     2.0     12-19    TPro  5.4<L>  /  Alb  2.9<L>  /  TBili  0.2  /  DBili  x   /  AST  40  /  ALT  31  /  AlkPhos  97  12-20    PT/INR - ( 20 Dec 2023 06:46 )   PT: 10.4 sec;   INR: 0.94 ratio         PTT - ( 20 Dec 2023 06:46 )  PTT:29.0 sec       DIagnostics :       Meds:  MEDICATIONS  (STANDING):  ascorbic acid 500 milliGRAM(s) Oral three times a day  ferrous    sulfate 325 milliGRAM(s) Oral three times a day  heparin   Injectable 5000 Unit(s) SubCutaneous every 12 hours  NIFEdipine XL 60 milliGRAM(s) Oral daily  prenatal multivitamin 1 Tablet(s) Oral daily      A/P:

## 2023-12-21 NOTE — DISCHARGE NOTE OB - PROVIDER TOKENS
FREE:[LAST:[[facility]],PHONE:[(   )    -],FAX:[(   )    -],ADDRESS:[[address] [place] [address] [address] [place] [address]   [phone]],SCHEDULEDAPPT:[12/26/2023],SCHEDULEDAPPTTIME:[11:00 AM]] FREE:[LAST:[[facility]],PHONE:[(   )    -],FAX:[(   )    -],ADDRESS:[[address] [place] [address] [address] [place] [address]   [phone]],SCHEDULEDAPPT:[12/26/2023],SCHEDULEDAPPTTIME:[11:00 AM]]

## 2023-12-21 NOTE — DISCHARGE NOTE OB - MEDICATION SUMMARY - MEDICATIONS TO TAKE
I will START or STAY ON the medications listed below when I get home from the hospital:    acetaminophen 325 mg oral tablet  -- 3 tab(s) by mouth every 6 hours As needed Mild Pain (1 - 3)  -- Indication: For pain    NIFEdipine 60 mg oral tablet, extended release  -- 1 tab(s) by mouth once a day  -- Indication: For hypertension    Prenatal Multivitamins with Folic Acid 1 mg oral tablet  -- 1 tab(s) by mouth once a day  -- Indication: For multivitamin    ferrous sulfate 325 mg (65 mg elemental iron) oral tablet  -- 1 tab(s) by mouth 3 times a day  -- Indication: For anemia

## 2023-12-21 NOTE — PROGRESS NOTE ADULT - SUBJECTIVE AND OBJECTIVE BOX
Postpartum Note     Interval events:    Patient seen and examined at bedside, no acute overnight events. No acute complaints. Denies HA, epigastric pain, blurred vision, CP, SOB, N/V, fevers, and chills.    Vital Signs Last 24 Hours  T(C): 36.9 (12-21-23 @ 01:05), Max: 36.9 (12-20-23 @ 17:16)  HR: 91 (12-21-23 @ 01:05) (91 - 98)  BP: 135/79 (12-21-23 @ 01:05) (116/69 - 165/89)  RR: 18 (12-21-23 @ 01:05) (18 - 18)  SpO2: 99% (12-21-23 @ 01:05) (99% - 100%)    Physical Exam:  General: NAD  Resp: breathing comfortably on RA  Abdomen: Soft, non-tender, fundus firm  Ext: No pain or swelling    Labs:             7.0    5.59  )-----------( 313      ( 12-20 @ 06:45 )             23.1     12-20 @ 06:44    142  |  109  |  10  ----------------------------<  91  3.8   |  22  |  0.69    Ca    8.1      12-20 @ 06:44  Mg     2.0     12-19 @ 15:25    TPro  5.4  /  Alb  2.9  /  TBili  0.2  /  DBili  x   /  AST  40  /  ALT  31  /  AlkPhos  97  12-20 @ 06:44    PT/INR - ( 12-20 @ 06:46 )   PT: 10.4 sec;   INR: 0.94 ratio    PTT - ( 12-20 @ 06:46 )  PTT:29.0 sec    Uric Acid: (12-20 @ 06:44)  5.0      Fibrinogen: (12-20 @ 06:44)  --       LDH: (12-20 @ 06:44)  367        MEDICATIONS  (STANDING):  ascorbic acid 500 milliGRAM(s) Oral three times a day  ferrous    sulfate 325 milliGRAM(s) Oral three times a day  heparin   Injectable 5000 Unit(s) SubCutaneous every 12 hours  NIFEdipine XL 60 milliGRAM(s) Oral daily  prenatal multivitamin 1 Tablet(s) Oral daily    MEDICATIONS  (PRN):  acetaminophen     Tablet .. 975 milliGRAM(s) Oral every 6 hours PRN Mild Pain (1 - 3)

## 2023-12-21 NOTE — DISCHARGE NOTE OB - PLAN OF CARE
Follow up in clinic within 1 week for a blood pressure check. If you have elevated blood pressures >160/110 or severe headache, blurry vision, increased swelling, or pain in your upper belly on the right side please come to Emergency Department. continue to follow postpartum education Follow up in clinic within 1 week for a blood pressure check. If you have elevated blood pressures >150/90 or severe headache, blurry vision, increased swelling, or pain in your upper belly on the right side please come to Emergency Department. Monitor blood pressure two times a day.

## 2023-12-21 NOTE — PROGRESS NOTE ADULT - ASSESSMENT
42yo POD#8 s/p pCS for worsening sPEC at outside hospital presented with headache SOB x2 days. Pt noted to have severe range BPx1 in the ED followed by elevated BPs for several hours in addition to tachypnia and significant LE extremity edema on initial presentation. S/p cardiomyopathy work up with patient now with improved symptoms. Stable overnight.     #sPEC  - s/p Magnesium for seizure prophylaxis at outside hospital  - Started on Procardia 60; BP's well controlled overnight  - HELLP labs wnl   - AST/ALT: 46/34 ->40/31  - cont to monitor for S/s sPEC  - CTH(12/19): WNL   - d/c home with BP cuff     #SOB/lower extremity edema   - symptomatically improved, saturating >95% on RA   - s/p TTE(12/19): WNL, EF 64%, trace mitral regurg  - CTA chest(12/19): WNL  - CXR (12/19):  Small layering right and trace left pleural effusions(p)  - POCUS TTE (12/19): Trace Pericardial Effusion with no gross evidence of tamponade. Bilateral b-lines and small pleural effusions suggesting pulmonary edema are present.   - appreciate cardio OB consult: S/p Lasix 40 IVP(12/19)    #postpartum   - Motrin, Tylenol, Oxy PRN  - HSQ for DVT ppx  - routine postpartum care    SIOBHAN Umana-Jones PGY3  44yo POD#8 s/p pCS for worsening sPEC at outside hospital presented with headache SOB x2 days. Pt noted to have severe range BPx1 in the ED followed by elevated BPs for several hours in addition to tachypnia and significant LE extremity edema on initial presentation. S/p cardiomyopathy work up with patient now with improved symptoms. Stable overnight.     #sPEC  - s/p Magnesium for seizure prophylaxis at outside hospital  - Started on Procardia 60; BP's well controlled overnight  - HELLP labs wnl   - AST/ALT: 46/34 ->40/31  - cont to monitor for S/s sPEC  - CTH(12/19): WNL   - d/c home with BP cuff     #SOB/lower extremity edema   - symptomatically improved, saturating >95% on RA   - s/p TTE(12/19): WNL, EF 64%, trace mitral regurg  - CTA chest(12/19): WNL  - CXR (12/19):  Small layering right and trace left pleural effusions(p)  - POCUS TTE (12/19): Trace Pericardial Effusion with no gross evidence of tamponade. Bilateral b-lines and small pleural effusions suggesting pulmonary edema are present.   - appreciate cardio OB consult: S/p Lasix 40 IVP(12/19)    #postpartum   - Motrin, Tylenol, Oxy PRN  - HSQ for DVT ppx  - routine postpartum care    SIOBHAN Umana-Jones PGY3

## 2023-12-21 NOTE — DISCHARGE NOTE OB - CARE PLAN
1 Principal Discharge DX:	Severe preeclampsia  Assessment and plan of treatment:	Follow up in clinic within 1 week for a blood pressure check. If you have elevated blood pressures >160/110 or severe headache, blurry vision, increased swelling, or pain in your upper belly on the right side please come to Emergency Department.   Principal Discharge DX:	Severe preeclampsia  Assessment and plan of treatment:	Follow up in clinic within 1 week for a blood pressure check. If you have elevated blood pressures >150/90 or severe headache, blurry vision, increased swelling, or pain in your upper belly on the right side please come to Emergency Department. Monitor blood pressure two times a day.  Assessment and plan of treatment:	continue to follow postpartum education

## 2023-12-21 NOTE — PROGRESS NOTE ADULT - ATTENDING COMMENTS
44yo readmit after pCS w sPEC in postpartum presented w LE edema to hip, SOB and HTN  cardiomyopathy workup negative  SOB resolve  edema improving  BP meds increase last night w improvement    Stable--anticipate discharge this afternoon    On discharge recommend f/u in 1 week for BP check  home BPs 2 x daily--call if persistent elevated BPs  discussed common long term issues w PEC and transition to PMD ~ 6 wks pp    Breast feeding-hydration, frequent pumping or baby to breast    All question answered    ICU Vital Signs Last 24 Hrs  T(C): 37 (21 Dec 2023 05:44), Max: 37 (21 Dec 2023 05:44)  T(F): 98.6 (21 Dec 2023 05:44), Max: 98.6 (21 Dec 2023 05:44)  HR: 105 (21 Dec 2023 05:44) (91 - 105)  BP: 136/82 (21 Dec 2023 05:44) (116/69 - 165/89)  RR: 18 (21 Dec 2023 05:44) (18 - 18)  SpO2: 100% (21 Dec 2023 05:44) (99% - 100%)    O2 Parameters below as of 21 Dec 2023 05:44  Patient On (Oxygen Delivery Method): room air      12-20    142  |  109<H>  |  10  ----------------------------<  91  3.8   |  22  |  0.69    Ca    8.1<L>      20 Dec 2023 06:44  Mg     2.0     12-19    TPro  5.4<L>  /  Alb  2.9<L>  /  TBili  0.2  /  DBili  x   /  AST  40  /  ALT  31  /  AlkPhos  97  12-20                          7.0    5.59  )-----------( 313      ( 20 Dec 2023 06:45 )             23.1     jassi Angeles MD  attending 42yo readmit after pCS w sPEC in postpartum presented w LE edema to hip, SOB and HTN  cardiomyopathy workup negative  SOB resolve  edema improving  BP meds increase last night w improvement    Stable--anticipate discharge this afternoon    On discharge recommend f/u in 1 week for BP check  home BPs 2 x daily--call if persistent elevated BPs  discussed common long term issues w PEC and transition to PMD ~ 6 wks pp    Breast feeding-hydration, frequent pumping or baby to breast    All question answered    ICU Vital Signs Last 24 Hrs  T(C): 37 (21 Dec 2023 05:44), Max: 37 (21 Dec 2023 05:44)  T(F): 98.6 (21 Dec 2023 05:44), Max: 98.6 (21 Dec 2023 05:44)  HR: 105 (21 Dec 2023 05:44) (91 - 105)  BP: 136/82 (21 Dec 2023 05:44) (116/69 - 165/89)  RR: 18 (21 Dec 2023 05:44) (18 - 18)  SpO2: 100% (21 Dec 2023 05:44) (99% - 100%)    O2 Parameters below as of 21 Dec 2023 05:44  Patient On (Oxygen Delivery Method): room air      12-20    142  |  109<H>  |  10  ----------------------------<  91  3.8   |  22  |  0.69    Ca    8.1<L>      20 Dec 2023 06:44  Mg     2.0     12-19    TPro  5.4<L>  /  Alb  2.9<L>  /  TBili  0.2  /  DBili  x   /  AST  40  /  ALT  31  /  AlkPhos  97  12-20                          7.0    5.59  )-----------( 313      ( 20 Dec 2023 06:45 )             23.1     jassi Angeles MD  attending

## 2023-12-21 NOTE — DISCHARGE NOTE OB - NS MD DC FALL RISK RISK
For information on Fall & Injury Prevention, visit: [email or web address] OR  [email or web address] OR  https://www.cdc.gov/steadi/patient.html For information on Fall & Injury Prevention, visit: [email or web address] OR  [email or web address] OR  https://www.cdc.gov/steadi/patient.html

## 2023-12-26 ENCOUNTER — APPOINTMENT (OUTPATIENT)
Dept: MATERNAL FETAL MEDICINE | Facility: CLINIC | Age: 43
End: 2023-12-26
Payer: COMMERCIAL

## 2023-12-26 VITALS
OXYGEN SATURATION: 99 % | SYSTOLIC BLOOD PRESSURE: 125 MMHG | DIASTOLIC BLOOD PRESSURE: 90 MMHG | WEIGHT: 181.13 LBS | HEART RATE: 97 BPM | TEMPERATURE: 98.5 F

## 2023-12-26 PROBLEM — Z00.00 ENCOUNTER FOR PREVENTIVE HEALTH EXAMINATION: Status: ACTIVE | Noted: 2023-12-26

## 2023-12-26 PROCEDURE — 99213 OFFICE O/P EST LOW 20 MIN: CPT | Mod: GE

## 2024-01-04 ENCOUNTER — NON-APPOINTMENT (OUTPATIENT)
Age: 44
End: 2024-01-04

## 2024-01-23 ENCOUNTER — APPOINTMENT (OUTPATIENT)
Dept: MATERNAL FETAL MEDICINE | Facility: CLINIC | Age: 44
End: 2024-01-23

## 2024-01-29 ENCOUNTER — APPOINTMENT (OUTPATIENT)
Dept: CARDIOLOGY | Facility: CLINIC | Age: 44
End: 2024-01-29
Payer: COMMERCIAL

## 2024-01-29 DIAGNOSIS — Z83.3 FAMILY HISTORY OF DIABETES MELLITUS: ICD-10-CM

## 2024-01-29 DIAGNOSIS — Z82.49 FAMILY HISTORY OF ISCHEMIC HEART DISEASE AND OTHER DISEASES OF THE CIRCULATORY SYSTEM: ICD-10-CM

## 2024-01-29 PROCEDURE — 99213 OFFICE O/P EST LOW 20 MIN: CPT | Mod: 95

## 2024-01-29 PROCEDURE — 99203 OFFICE O/P NEW LOW 30 MIN: CPT

## 2024-02-03 PROBLEM — Z83.3 FAMILY HISTORY OF TYPE 2 DIABETES MELLITUS: Status: ACTIVE | Noted: 2024-02-03

## 2024-02-03 PROBLEM — Z82.49 FAMILY HISTORY OF ESSENTIAL HYPERTENSION: Status: ACTIVE | Noted: 2024-02-03

## 2024-02-03 NOTE — REASON FOR VISIT
[Home] : at home, [unfilled] , at the time of the visit. [Other Location: e.g. Home (Enter Location, City,State)___] : at [unfilled] [Patient] : the patient [FreeTextEntry2] : Constanza Stock

## 2024-02-03 NOTE — ASSESSMENT
[FreeTextEntry1] : Ms. Stock is a 44 year old female that presents [facility] for blood pressure management postpartum.  Personal history of advance maternal age, preeclampsia and gestational hypertension  #Gestational hypertension   Blood pressure reported today to be in good control   Requested BP log for one week and will titrate medication as needed   Continue on Nifedipine ER 60mg daily.   Patient is to watch out for signs and symptoms of hypotension.   Watch sodium intake and increase fluid intake.    Reviewed recent echocardiogram perfomred on December 19, 2023   Results:    LV is normal    LVEF 64%    Normal LV diastolic function    Normall RV size and systolic function    Struturally normal mitral valve with normal leaflet excursion    Trace MR  #Adverse Cardiovascular Risk Factors  Personal history of preeclampsia, gestational hypertension Family history of diabetes, hypertenson  Recommending a baseline cardiovascular evaluation 3 months postpartum to assess risk In-office physical examination and 12 lead EKG Exercise stress testing to assess functional status Full blood work panel:  CBC,CMP, Hgb A1C, TSH, Lipid profile  - Encouraged patient to participate in  healthy exercise (when clared by OB) and eating habits, focusing on a Mediterranean style of eating and aiming for the recommended 150 minutes per week of moderate physical activity.  - Encouraged the patient to find healthy outlets and coping mechanisms to help manage stress, such as physical activity/exercise, reducing workload if possible, spending time with family and friends, engaging in an enjoyable hobby, or using meditation or mindfulness techniques.

## 2024-02-03 NOTE — HISTORY OF PRESENT ILLNESS
[FreeTextEntry1] : Ms. Stock is a 44 year old female that presents [facility] for blood pressure management postpartum.  Personal history of miscarriage and infertility. Conception via IVF  She delivered a baby on 2023 at [place] by  section with complications of preeclampsia with severe features->treated with IV Magnesium and then discharged to home without BP medications.   On POD # 6 , she presented to [facility] with persistent headache and worsening shortness of breath.  She was admitted for mangement of symptoms as well as BP control.  She had a CT head and CXR done that was normal. Echo showed EF of 64%.  CXR showed small laying of trace left pleural effusion. Patient was given Lasix and started on Procardia XL 30 mg and then uptritrated to  60 mg QD prior discharge.   Blood pressure today:  127/87  Pregnancy history  1st pregnancy - Spring 2022, 1st trimester miscarriage  2nd pregnancy-  2022, repeat miscarriage   3rd pregnancy-  IVF concenption with autoimmune protocol: Lovenox 40 mg sq X 12 weeks Prednisone 10 mg qD, iron and Vit C Delivered 2023  Family history of diabetes and hypertension  Patient reports feeling well and denies any issues with shortness of breath, chest discomfort or palpitations.

## 2024-02-05 ENCOUNTER — NON-APPOINTMENT (OUTPATIENT)
Age: 44
End: 2024-02-05

## 2024-02-22 PROCEDURE — 99442: CPT | Mod: 93

## 2024-03-20 ENCOUNTER — RX CHANGE (OUTPATIENT)
Age: 44
End: 2024-03-20

## 2024-03-20 ENCOUNTER — NON-APPOINTMENT (OUTPATIENT)
Age: 44
End: 2024-03-20

## 2024-03-20 RX ORDER — NIFEDIPINE 30 MG/1
30 TABLET, EXTENDED RELEASE ORAL
Qty: 120 | Refills: 0 | Status: DISCONTINUED | COMMUNITY
Start: 2024-02-22 | End: 2024-03-20

## 2024-04-08 ENCOUNTER — APPOINTMENT (OUTPATIENT)
Dept: CARDIOLOGY | Facility: CLINIC | Age: 44
End: 2024-04-08
Payer: COMMERCIAL

## 2024-04-08 VITALS
DIASTOLIC BLOOD PRESSURE: 90 MMHG | BODY MASS INDEX: 32.57 KG/M2 | WEIGHT: 177 LBS | SYSTOLIC BLOOD PRESSURE: 132 MMHG | OXYGEN SATURATION: 99 % | HEIGHT: 62 IN | HEART RATE: 74 BPM

## 2024-04-08 DIAGNOSIS — R06.00 DYSPNEA, UNSPECIFIED: ICD-10-CM

## 2024-04-08 PROCEDURE — 93000 ELECTROCARDIOGRAM COMPLETE: CPT

## 2024-04-08 PROCEDURE — 99214 OFFICE O/P EST MOD 30 MIN: CPT | Mod: 25

## 2024-04-08 RX ORDER — NIFEDIPINE 30 MG/1
30 TABLET, EXTENDED RELEASE ORAL
Qty: 90 | Refills: 3 | Status: ACTIVE | COMMUNITY
Start: 1900-01-01 | End: 1900-01-01

## 2024-04-08 NOTE — HISTORY OF PRESENT ILLNESS
[FreeTextEntry1] : Ms. Stock is a 44 year old female  seen today for follow up. Intially presented to [facility] for blood pressure management postpartum. 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 Conception via IVF  She delivered a baby on 2023 at [facility]) by  section with complications of preeclampsia with severe features->treated with IV Magnesium and then discharged to home without BP medications.   On POD # 6 , she presented to [facility] with persistent headache and worsening shortness of breath.  She was admitted for management of symptoms as well as BP control.  She had a CT head and CXR done that was normal. Echo showed EF of 64%.  CXR showed small laying of trace left pleural effusion. Patient was given Lasix and started on Procardia XL 30 mg and then uptritrated to  60 mg QD prior discharge.  Pregnancy history  1st pregnancy - Spring 2022, 1st trimester miscarriage 2nd pregnancy-  2022, repeat miscarriage  3rd pregnancy-  IVF concenption with autoimmune protocol: Lovenox 40 mg sq X 12 weeks Prednisone 10 mg qD, iron and Vit C Delivered 2023 Family history of diabetes and hypertension:  Mother Type II DM, HTN,, 76 and living; Father unknown  Patient reports feeling well and denies any issues with shortness of breath, chest discomfort or palpitations.  BP today:  132/90: home log within similar range EKG:  NSR , non specific T wave changes Labwork:  Diagnostics.  TTE 2023: Normal LV systolic function, EF 64%, Trace MR

## 2024-04-08 NOTE — END OF VISIT
[Time Spent: ___ minutes] : I have spent [unfilled] minutes of time on the encounter. [FreeTextEntry3] : I, Dr. Daisha Gross, personally performed the evaluation and management (E/M) services for this established patient who presents today with (a) new problem(s)/exacerbation of (an) existing condition(s).  That E/M includes conducting the examination, assessing all new/exacerbated conditions, and establishing a new plan of care.  Today, my ACP, was here to observe my evaluation and management services for this new problem/exacerbated condition to be followed going forward.

## 2024-04-08 NOTE — DISCUSSION/SUMMARY
[EKG obtained to assist in diagnosis and management of assessed problem(s)] : EKG obtained to assist in diagnosis and management of assessed problem(s) [FreeTextEntry1] : Ms. Stock is a 44 year old female  seen today for follow up Post partum eclampsia.  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 Conception via IVF She delivered a baby on December 13, 2023 at [facility]) by killian arean section with complications of preeclampsia with severe features->treated with IV Magnesium and then discharged to home without BP medications.   On POD # 6 , she presented to [facility] with persistent headache and worsening shortness of breath.  She was admitted for management of symptoms as well as BP control.  She had a CT head and CXR done that was normal. Echo showed EF of 64%.  BP currently well controlled on PRocardia XL 30 mg daily.  Pregnancy history 1st pregnancy - Spring 2022, 1st trimester miscarriage 2nd pregnancy-  Fall 2022, repeat miscarriage  3rd pregnancy-  IVF concenption with autoimmune protocol:  Patient reports feeling well and denies any issues with shortness of breath, chest discomfort or palpitations. EKG: no ischemic changes, NSR with no specific T wave abnormalities.   # Post partum Eclampsia - Encouraged the patient to monitor blood pressure at home, keep a log, and report results back to us for evaluation. Based on results, we will adjust the regimen as necessary. -Continues PRocardia XL 30 mg daily - Encouraged patient to participate in  healthy walking and exercise (when cleared by OB)  and eating habits, focusing on a Mediterranean style of eating and aiming for the recommended 150 minutes per week of moderate physical activity. - Encouraged the patient to find healthy outlets and coping mechanisms to help manage stress, such as physical activity/exercise, reducing workload if possible, spending time with family and friends, engaging in an enjoyable hobby, or using meditation or mindfulness techniques. -Echocardiogram and Stress test at 6 months -Labwork at 6 mo as well  f/u 3 months

## 2024-04-08 NOTE — DISCUSSION/SUMMARY
[EKG obtained to assist in diagnosis and management of assessed problem(s)] : EKG obtained to assist in diagnosis and management of assessed problem(s) [FreeTextEntry1] : Ms. Stock is a 44 year old female  seen today for follow up Post partum eclampsia.  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 Conception via IVF She delivered a baby on December 13, 2023 at [facility]) by killian arean section with complications of preeclampsia with severe features->treated with IV Magnesium and then discharged to home without BP medications.   On POD # 6 , she presented to [facility] with persistent headache and worsening shortness of breath.  She was admitted for management of symptoms as well as BP control.  She had a CT head and CXR done that was normal. Echo showed EF of 64%.  BP currently well controlled on PRocardia XL 30 mg daily.  Pregnancy history 1st pregnancy - Spring 2022, 1st trimester miscarriage 2nd pregnancy-  Fall 2022, repeat miscarriage  3rd pregnancy-  IVF concenption with autoimmune protocol:  Patient reports feeling well and denies any issues with shortness of breath, chest discomfort or palpitations. EKG: no ischemic changes, NSR with no specific T wave abnormalities.   # Post partum Eclampsia - Encouraged the patient to monitor blood pressure at home, keep a log, and report results back to us for evaluation. Based on results, we will adjust the regimen as necessary. -Continues PRocardia XL 30 mg daily - Encouraged patient to participate in  healthy walking and exercise (when cleared by OB)  and eating habits, focusing on a Mediterranean style of eating and aiming for the recommended 150 minutes per week of moderate physical activity. - Encouraged the patient to find healthy outlets and coping mechanisms to help manage stress, such as physical activity/exercise, reducing workload if possible, spending time with family and friends, engaging in an enjoyable hobby, or using meditation or mindfulness techniques. -Echocardiogram and Stress test at 6 months -Labwork at 6 mo as well  f/u 3 months

## 2024-04-08 NOTE — HISTORY OF PRESENT ILLNESS
[FreeTextEntry1] : Ms. Stock is a 44 year old female  seen today for follow up. Intially presented [facility] for blood pressure management postpartum. 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 Conception via IVF  She delivered a baby on 2023 at [facility]) by  section with complications of preeclampsia with severe features->treated with IV Magnesium and then discharged to home without BP medications.   On POD # 6 , she presented to [facility] with persistent headache and worsening shortness of breath.  She was admitted for management of symptoms as well as BP control.  She had a CT head and CXR done that was normal. Echo showed EF of 64%.  CXR showed small laying of trace left pleural effusion. Patient was given Lasix and started on Procardia XL 30 mg and then uptritrated to  60 mg QD prior discharge.  Pregnancy history  1st pregnancy - Spring 2022, 1st trimester miscarriage 2nd pregnancy-  2022, repeat miscarriage  3rd pregnancy-  IVF concenption with autoimmune protocol: Lovenox 40 mg sq X 12 weeks Prednisone 10 mg qD, iron and Vit C Delivered 2023 Family history of diabetes and hypertension:  Mother Type II DM, HTN,, 76 and living; Father unknown  Patient reports feeling well and denies any issues with shortness of breath, chest discomfort or palpitations.  BP today:  132/90: home log within similar range EKG:  NSR , non specific T wave changes Labwork:  Diagnostics.  TTE 2023: Normal LV systolic function, EF 64%, Trace MR

## 2024-04-09 PROBLEM — J45.909 UNSPECIFIED ASTHMA, UNCOMPLICATED: Chronic | Status: ACTIVE | Noted: 2023-12-19

## 2024-04-09 PROBLEM — O14.90 UNSPECIFIED PRE-ECLAMPSIA, UNSPECIFIED TRIMESTER: Chronic | Status: ACTIVE | Noted: 2023-12-19

## 2024-07-19 ENCOUNTER — APPOINTMENT (OUTPATIENT)
Dept: CV DIAGNOSITCS | Facility: HOSPITAL | Age: 44
End: 2024-07-19

## 2024-07-19 ENCOUNTER — RESULT REVIEW (OUTPATIENT)
Age: 44
End: 2024-07-19

## 2024-07-19 ENCOUNTER — APPOINTMENT (OUTPATIENT)
Dept: CV DIAGNOSTICS | Facility: HOSPITAL | Age: 44
End: 2024-07-19

## 2024-07-19 ENCOUNTER — OUTPATIENT (OUTPATIENT)
Dept: OUTPATIENT SERVICES | Facility: HOSPITAL | Age: 44
LOS: 1 days | End: 2024-07-19
Payer: COMMERCIAL

## 2024-07-19 DIAGNOSIS — R06.00 DYSPNEA, UNSPECIFIED: ICD-10-CM

## 2024-07-19 PROCEDURE — 76376 3D RENDER W/INTRP POSTPROCES: CPT | Mod: 26

## 2024-07-19 PROCEDURE — 76376 3D RENDER W/INTRP POSTPROCES: CPT

## 2024-07-19 PROCEDURE — 93306 TTE W/DOPPLER COMPLETE: CPT | Mod: 26

## 2024-07-19 PROCEDURE — 93306 TTE W/DOPPLER COMPLETE: CPT

## 2024-07-23 ENCOUNTER — RESULT REVIEW (OUTPATIENT)
Age: 44
End: 2024-07-23

## 2024-07-24 ENCOUNTER — APPOINTMENT (OUTPATIENT)
Dept: CV DIAGNOSTICS | Facility: HOSPITAL | Age: 44
End: 2024-07-24

## 2024-07-25 ENCOUNTER — APPOINTMENT (OUTPATIENT)
Dept: CV DIAGNOSTICS | Facility: HOSPITAL | Age: 44
End: 2024-07-25

## 2024-07-25 DIAGNOSIS — R94.39 ABNORMAL RESULT OF OTHER CARDIOVASCULAR FUNCTION STUDY: ICD-10-CM

## 2024-08-29 ENCOUNTER — APPOINTMENT (OUTPATIENT)
Dept: CT IMAGING | Facility: CLINIC | Age: 44
End: 2024-08-29

## 2024-08-29 ENCOUNTER — OUTPATIENT (OUTPATIENT)
Dept: OUTPATIENT SERVICES | Facility: HOSPITAL | Age: 44
LOS: 1 days | End: 2024-08-29
Payer: COMMERCIAL

## 2024-08-29 DIAGNOSIS — R06.00 DYSPNEA, UNSPECIFIED: ICD-10-CM

## 2024-08-29 PROCEDURE — 75574 CT ANGIO HRT W/3D IMAGE: CPT

## 2024-08-29 PROCEDURE — 75574 CT ANGIO HRT W/3D IMAGE: CPT | Mod: 26

## 2024-09-24 ENCOUNTER — APPOINTMENT (OUTPATIENT)
Dept: CARDIOLOGY | Facility: CLINIC | Age: 44
End: 2024-09-24
Payer: COMMERCIAL

## 2024-09-24 PROCEDURE — 99214 OFFICE O/P EST MOD 30 MIN: CPT

## 2024-09-24 NOTE — REASON FOR VISIT
[Home] : at home, [unfilled] , at the time of the visit. [Medical Office: ([facility])___] : at the medical office located in  [Patient] : the patient

## 2024-09-24 NOTE — HISTORY OF PRESENT ILLNESS
[FreeTextEntry1] : Ms. Stock is a 44 year old female  seen today for follow up. Intially presented to [facility] for blood pressure management postpartum. 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 Conception via IVF  She delivered a baby on 2023 at [facility]) by  section with complications of preeclampsia with severe features->treated with IV Magnesium and then discharged to home without BP medications.   On POD # 6 , she presented to [facility] with persistent headache and worsening shortness of breath.  She was admitted for management of symptoms as well as BP control.  She had a CT head and CXR done that was normal. Echo showed EF of 64%.  CXR showed small laying of trace left pleural effusion. Patient was given Lasix and started on Procardia XL 30 mg and then uptritrated to  60 mg QD prior discharge.  Pregnancy history  1st pregnancy - Spring 2022, 1st trimester miscarriage 2nd pregnancy-  2022, repeat miscarriage  3rd pregnancy-  IVF concenption with autoimmune protocol: Lovenox 40 mg sq X 12 weeks Prednisone 10 mg qD, iron and Vit C Delivered 2023 Family history of diabetes and hypertension:  Mother Type II DM, HTN,, 76 and living; Father unknown  Patient reports feeling well and denies any issues with shortness of breath, chest discomfort or palpitations.  BP today: 130-140/ with no meds: with meds 110-120/90: home log within similar range EKG:  NSR , non specific T wave changes Labwork:  Diagnostics.  TTE 2023: Normal LV systolic function, EF 64%, Trace MR  stress test 2024: Conclusions:  1. The patient underwent stress testing using the standard Nino protocol.     _ The patient exercised for 6 min 0 sec.     _ The test was stopped due to patient request, shortness of breath and fatigue.     _ The peak heart rate was 160 bpm; 91 % of predicted maximal heart rate for this patient.     _ The patient achieved 7 METS which is consistent with fair exercise capacity.  2. Baseline electrocardiogram: normal sinus rhythm at a rate of 74 bpm with small q waves in II, III, aVF and non-specific T wave abnormality.  3. Electrocardiogram ischemic changes: There were approximately 0.5 mm upsloping ST segment depressions in leads V3-V6 starting at 3:00 min of exercise at 137 bpm and persisting 50 s in recovery. These changes did not meet ischemic criteria.  4. Arrhythmias: No arrhythmia associated with stress.  5. The Duke Treadmill Score is 3.50, which is consistent with intermediate risk (-10 to <5) for future cardiac events.  ECHO 2024  CONCLUSIONS:  1. Normal echocardiogram.  2024 Cardiac CTA - IMPRESSION: No coronary artery disease.

## 2024-09-24 NOTE — DISCUSSION/SUMMARY
[FreeTextEntry1] : Ms. Stock is a 44 year old female  seen today for follow up. Intially presented to [facility] for blood pressure management postpartum. 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 Conception via IVF  She delivered a baby on 2023 at [facility]) by  section with complications of preeclampsia with severe features->treated with IV Magnesium and then discharged to home without BP medications.   On POD # 6 , she presented to [facility] with persistent headache and worsening shortness of breath.  She was admitted for management of symptoms as well as BP control.  She had a CT head and CXR done that was normal. Echo showed EF of 64%.  CXR showed small laying of trace left pleural effusion. Patient was given Lasix and started on Procardia XL 30 mg and then uptritrated to  60 mg QD prior discharge.  Pregnancy history  1st pregnancy - Spring 2022, 1st trimester miscarriage 2nd pregnancy-  2022, repeat miscarriage  3rd pregnancy-  IVF concenption with autoimmune protocol: Lovenox 40 mg sq X 12 weeks Prednisone 10 mg qD, iron and Vit C Delivered 2023 Family history of diabetes and hypertension:  Mother Type II DM, HTN,, 76 and living; Father unknown  Patient reports feeling well and denies any issues with shortness of breath, chest discomfort or palpitations.  BP today: 130-140/ with no meds: with meds 110-120/90: home log within similar range EKG:  NSR , non specific T wave changes Labwork:  Diagnostics.  TTE 2023: Normal LV systolic function, EF 64%, Trace MR  stress test 2024: Conclusions:  1. The patient underwent stress testing using the standard Nino protocol.     _ The patient exercised for 6 min 0 sec.     _ The test was stopped due to patient request, shortness of breath and fatigue.     _ The peak heart rate was 160 bpm; 91 % of predicted maximal heart rate for this patient.     _ The patient achieved 7 METS which is consistent with fair exercise capacity.  2. Baseline electrocardiogram: normal sinus rhythm at a rate of 74 bpm with small q waves in II, III, aVF and non-specific T wave abnormality.  3. Electrocardiogram ischemic changes: There were approximately 0.5 mm upsloping ST segment depressions in leads V3-V6 starting at 3:00 min of exercise at 137 bpm and persisting 50 s in recovery. These changes did not meet ischemic criteria.  4. Arrhythmias: No arrhythmia associated with stress.  5. The Duke Treadmill Score is 3.50, which is consistent with intermediate risk (-10 to <5) for future cardiac events.  ECHO 2024  CONCLUSIONS:  1. Normal echocardiogram.  2024 Cardiac CTA - IMPRESSION: No coronary artery disease.

## 2024-10-07 ENCOUNTER — APPOINTMENT (OUTPATIENT)
Dept: CARDIOLOGY | Facility: CLINIC | Age: 44
End: 2024-10-07

## 2024-11-12 ENCOUNTER — APPOINTMENT (OUTPATIENT)
Dept: CARDIOLOGY | Facility: CLINIC | Age: 44
End: 2024-11-12
Payer: COMMERCIAL

## 2024-11-12 PROCEDURE — 99214 OFFICE O/P EST MOD 30 MIN: CPT

## 2024-11-12 PROCEDURE — G2211 COMPLEX E/M VISIT ADD ON: CPT | Mod: NC

## 2024-11-13 ENCOUNTER — APPOINTMENT (OUTPATIENT)
Dept: INTERNAL MEDICINE | Facility: CLINIC | Age: 44
End: 2024-11-13

## 2025-01-04 ENCOUNTER — EMERGENCY (EMERGENCY)
Facility: HOSPITAL | Age: 45
LOS: 1 days | Discharge: HOME | End: 2025-01-04
Attending: EMERGENCY MEDICINE | Admitting: EMERGENCY MEDICINE
Payer: COMMERCIAL

## 2025-01-04 VITALS
OXYGEN SATURATION: 99 % | DIASTOLIC BLOOD PRESSURE: 77 MMHG | SYSTOLIC BLOOD PRESSURE: 117 MMHG | TEMPERATURE: 98 F | RESPIRATION RATE: 18 BRPM | HEART RATE: 82 BPM | WEIGHT: 173.94 LBS

## 2025-01-04 PROCEDURE — 99284 EMERGENCY DEPT VISIT MOD MDM: CPT

## 2025-01-04 PROCEDURE — 93971 EXTREMITY STUDY: CPT | Mod: 26,LT

## 2025-01-04 NOTE — ED PROVIDER NOTE - ATTENDING CONTRIBUTION TO CARE
Attending note:   After face to face evaluation of this patient, I concur with above noted hx, pe, and care plan for this patient. Ray: 44-year-old female with history of hypertension presents ED with 2 days of left anterior shin pain.  Patient also noted swelling at the ankle.  Patient denies calf pain or pain worse with movement.  Patient denies any trauma.  Patient denies any OCP use, shortness of breath, chest pain or travel.  Patient has no recent surgeries or immobilizations.  Initially patient was noted to have slightly increased size of left leg and lower EXTR apart but after elevation and removal of sock upon my examination there is very minimal edema noted over the anterior shin.  However no pitting is noted.  Ankle and foot appear same size.  Pulses and sensation are normal.  Full range of motion is noted.  There is no calf tenderness or calf edema noted.  Full range of motion of the knee is noted.  Rest of exam is unremarkable.  A POCUS ultrasound shows no DVT.  Patient appears to have some mild edema of the anterior shin but no evidence of infection.  Would recommend NSAIDs and outpatient follow-up with sports med and repeat ultrasound in 5 to 7 days.  Return precautions discussed.

## 2025-01-04 NOTE — ED PROVIDER NOTE - PROGRESS NOTE DETAILS
Nadeen Garza DO (PGY-1): US negative for DVT will give sports medicine followup and recommend follow up DVT study in 5-7 days.

## 2025-01-04 NOTE — ED PROVIDER NOTE - CLINICAL SUMMARY MEDICAL DECISION MAKING FREE TEXT BOX
44-year-old female history of hypertension presenting to the emergency department with 2 days of left lower extremity nontraumatic edema and tenderness.  Noted swelling around the L ankle which progressed to the left shin yesterday.  Denies any oral contraceptive, recent travel, recent surgery.  On exam patient is well-appearing left shin slightly more edematous than right.  Nonpitting edema.  DP pulses intact. she has mild tenderness to palpation around mid anterior left lower extremity.  Differential includes DVT, overuse injury, low concern for cellulitis as area is not erythematous or warm. Low concern for fracture as patient has full rom and no trauma. Will obtain DVT US and re-eval.

## 2025-01-04 NOTE — ED PROVIDER NOTE - PHYSICAL EXAMINATION
General: WN/WD NAD  Head: Atraumatic  Eyes: EOM grossly in tact, no scleral icterus  ENT: moist mucous membranes  Neurology: A&Ox3, nonfocal, DURON x 4  Respiratory: normal respiratory effort  CV: Extremities warm and well perfused  Abdominal: Soft, non-distended  Extremities: mild non pitting L shin edema, DP pulses intact bilaterally, full rom bilateral LE, sensation intact bilaterally   Skin: No rashes, erythema or abrasions

## 2025-01-04 NOTE — ED PROVIDER NOTE - NSFOLLOWUPINSTRUCTIONS_ED_ALL_ED_FT
You were seen in the emergency department for evaluation of left lower extremity swelling.  We obtained an ultrasound which revealed no evidence of blood clots.  To help reduce swelling we recommend icing and elevation of lower extremity above heart when resting.  As well as ibuprofen to decrease inflammation.  We will give you sports medicine follow-up to further evaluate the swelling.  Please visit your primary care physician and the sports medicine doctor as soon as possible to arrange a follow-up ultrasound in the next 5 to 7 days.  If you are unable to get an appointment in the next 5 to 7 days please return to the emergency department for follow-up ultrasound.      To control your pain at home, you should take Ibuprofen 400 mg along with Tylenol 650mg-1000mg every 6 to 8 hours. Limit your maximum daily Tylenol from all sources to 4000mg. Be aware that many other medications contain acetaminophen which is also known as Tylenol. Taking Tylenol and Ibuprofen together has been shown to be more effective at relieving pain than taking them separately. These are both over the counter medications that you can  at your local pharmacy without a prescription. You need to respect all of the warnings on the bottles. You shouldn’t take these medications for more than a week without following up with your doctor. Both medications come with certain risks and side effects that you need to discuss with your doctor, especially if you are taking them for a prolonged period.
[Patient Intake Form Reviewed] : Patient intake form was reviewed

## 2025-01-04 NOTE — ED ADULT TRIAGE NOTE - CHIEF COMPLAINT QUOTE
Patient has c/o swelling and pain to left leg radiating to ankle, calf. Patient feels internal heat rush.

## 2025-01-04 NOTE — ED PROVIDER NOTE - NSFOLLOWUPCLINICS_GEN_ALL_ED_FT
[facility]  Sports Medicine  [address] [place] [address]  Phone: [phone]  Fax:

## 2025-01-04 NOTE — ED PROVIDER NOTE - TOBACCO USE
Unknown if ever smoked Cyclophosphamide Counseling:  I discussed with the patient the risks of cyclophosphamide including but not limited to hair loss, hormonal abnormalities, decreased fertility, abdominal pain, diarrhea, nausea and vomiting, bone marrow suppression and infection. The patient understands that monitoring is required while taking this medication.

## 2025-01-04 NOTE — ED PROVIDER NOTE - PATIENT PORTAL LINK FT
You can access the FollowMyHealth Patient Portal offered by [facility] by registering at the following website: [email or web address]. By joining [facility]’s FollowMyHealth portal, you will also be able to view your health information using other applications (apps) compatible with our system.

## 2025-01-14 ENCOUNTER — APPOINTMENT (OUTPATIENT)
Age: 45
End: 2025-01-14
Payer: COMMERCIAL

## 2025-01-14 ENCOUNTER — NON-APPOINTMENT (OUTPATIENT)
Age: 45
End: 2025-01-14

## 2025-01-14 VITALS — BODY MASS INDEX: 32.02 KG/M2 | WEIGHT: 174 LBS | HEIGHT: 62 IN

## 2025-01-14 DIAGNOSIS — M25.473 EFFUSION, UNSPECIFIED ANKLE: ICD-10-CM

## 2025-01-14 DIAGNOSIS — M25.472 EFFUSION, LEFT ANKLE: ICD-10-CM

## 2025-01-14 PROCEDURE — 99214 OFFICE O/P EST MOD 30 MIN: CPT

## 2025-01-29 ENCOUNTER — APPOINTMENT (OUTPATIENT)
Dept: INTERNAL MEDICINE | Facility: CLINIC | Age: 45
End: 2025-01-29
Payer: COMMERCIAL

## 2025-01-29 ENCOUNTER — LABORATORY RESULT (OUTPATIENT)
Age: 45
End: 2025-01-29

## 2025-01-29 VITALS
OXYGEN SATURATION: 95 % | HEART RATE: 90 BPM | HEIGHT: 62 IN | TEMPERATURE: 98.3 F | DIASTOLIC BLOOD PRESSURE: 78 MMHG | BODY MASS INDEX: 32.02 KG/M2 | SYSTOLIC BLOOD PRESSURE: 117 MMHG | WEIGHT: 174 LBS

## 2025-01-29 DIAGNOSIS — M25.473 EFFUSION, UNSPECIFIED ANKLE: ICD-10-CM

## 2025-01-29 DIAGNOSIS — I10 ESSENTIAL (PRIMARY) HYPERTENSION: ICD-10-CM

## 2025-01-29 DIAGNOSIS — R06.00 DYSPNEA, UNSPECIFIED: ICD-10-CM

## 2025-01-29 DIAGNOSIS — Z00.00 ENCOUNTER FOR GENERAL ADULT MEDICAL EXAMINATION W/OUT ABNORMAL FINDINGS: ICD-10-CM

## 2025-01-29 DIAGNOSIS — Z13.228 ENCOUNTER FOR SCREENING FOR OTHER METABOLIC DISORDERS: ICD-10-CM

## 2025-01-29 PROCEDURE — 99386 PREV VISIT NEW AGE 40-64: CPT | Mod: 25

## 2025-01-29 PROCEDURE — 36415 COLL VENOUS BLD VENIPUNCTURE: CPT

## 2025-01-29 RX ORDER — ERGOCALCIFEROL (VITAMIN D2) 10 MCG
27-0.8 TABLET ORAL DAILY
Refills: 0 | Status: ACTIVE | COMMUNITY
Start: 2025-01-29

## 2025-01-30 ENCOUNTER — TRANSCRIPTION ENCOUNTER (OUTPATIENT)
Age: 45
End: 2025-01-30

## 2025-01-30 ENCOUNTER — NON-APPOINTMENT (OUTPATIENT)
Age: 45
End: 2025-01-30

## 2025-02-07 LAB
ALBUMIN SERPL ELPH-MCNC: 4.5 G/DL
ALP BLD-CCNC: 87 U/L
ALT SERPL-CCNC: 19 U/L
ANION GAP SERPL CALC-SCNC: 12 MMOL/L
AST SERPL-CCNC: 23 U/L
BASOPHILS # BLD AUTO: 0.03 K/UL
BASOPHILS NFR BLD AUTO: 0.6 %
BILIRUB SERPL-MCNC: 0.3 MG/DL
BUN SERPL-MCNC: 12 MG/DL
CALCIUM SERPL-MCNC: 9.1 MG/DL
CHLORIDE SERPL-SCNC: 106 MMOL/L
CHOLEST SERPL-MCNC: 190 MG/DL
CO2 SERPL-SCNC: 25 MMOL/L
CREAT SERPL-MCNC: 0.86 MG/DL
EGFR: 85 ML/MIN/1.73M2
EOSINOPHIL # BLD AUTO: 0.09 K/UL
EOSINOPHIL NFR BLD AUTO: 1.7 %
ESTIMATED AVERAGE GLUCOSE: 114 MG/DL
FERRITIN SERPL-MCNC: 59 NG/ML
FOLATE SERPL-MCNC: >20 NG/ML
GLUCOSE SERPL-MCNC: 87 MG/DL
HBA1C MFR BLD HPLC: 5.6 %
HCT VFR BLD CALC: 40.1 %
HDLC SERPL-MCNC: 79 MG/DL
HGB BLD-MCNC: 11.8 G/DL
IMM GRANULOCYTES NFR BLD AUTO: 0.2 %
IRON SATN MFR SERPL: 18 %
IRON SERPL-MCNC: 58 UG/DL
LDLC SERPL CALC-MCNC: 102 MG/DL
LYMPHOCYTES # BLD AUTO: 1.39 K/UL
LYMPHOCYTES NFR BLD AUTO: 26.8 %
MAN DIFF?: NORMAL
MCHC RBC-ENTMCNC: 22.3 PG
MCHC RBC-ENTMCNC: 29.4 G/DL
MCV RBC AUTO: 75.7 FL
MONOCYTES # BLD AUTO: 0.69 K/UL
MONOCYTES NFR BLD AUTO: 13.3 %
NEUTROPHILS # BLD AUTO: 2.98 K/UL
NEUTROPHILS NFR BLD AUTO: 57.4 %
NONHDLC SERPL-MCNC: 111 MG/DL
PLATELET # BLD AUTO: 283 K/UL
POTASSIUM SERPL-SCNC: 4.6 MMOL/L
PROT SERPL-MCNC: 7.3 G/DL
RBC # BLD: 5.3 M/UL
RBC # FLD: 20.1 %
SODIUM SERPL-SCNC: 142 MMOL/L
TIBC SERPL-MCNC: 331 UG/DL
TRIGL SERPL-MCNC: 41 MG/DL
TSH SERPL-ACNC: 1.48 UIU/ML
UIBC SERPL-MCNC: 273 UG/DL
VIT B12 SERPL-MCNC: 944 PG/ML
WBC # FLD AUTO: 5.19 K/UL

## 2025-02-12 ENCOUNTER — APPOINTMENT (OUTPATIENT)
Dept: CARDIOLOGY | Facility: CLINIC | Age: 45
End: 2025-02-12
Payer: COMMERCIAL

## 2025-02-12 VITALS
WEIGHT: 175 LBS | OXYGEN SATURATION: 100 % | HEIGHT: 62 IN | BODY MASS INDEX: 32.2 KG/M2 | DIASTOLIC BLOOD PRESSURE: 80 MMHG | SYSTOLIC BLOOD PRESSURE: 121 MMHG | HEART RATE: 85 BPM

## 2025-02-12 DIAGNOSIS — I10 ESSENTIAL (PRIMARY) HYPERTENSION: ICD-10-CM

## 2025-02-12 PROCEDURE — G2211 COMPLEX E/M VISIT ADD ON: CPT | Mod: NC

## 2025-02-12 PROCEDURE — 93000 ELECTROCARDIOGRAM COMPLETE: CPT

## 2025-02-12 PROCEDURE — 99215 OFFICE O/P EST HI 40 MIN: CPT

## 2025-02-12 RX ORDER — LABETALOL HYDROCHLORIDE 100 MG/1
100 TABLET, FILM COATED ORAL
Qty: 90 | Refills: 3 | Status: ACTIVE | COMMUNITY
Start: 2025-02-12 | End: 1900-01-01

## 2025-02-25 ENCOUNTER — APPOINTMENT (OUTPATIENT)
Dept: SPORTS MEDICINE | Facility: CLINIC | Age: 45
End: 2025-02-25

## 2025-03-06 ENCOUNTER — RX CHANGE (OUTPATIENT)
Age: 45
End: 2025-03-06

## 2025-03-06 RX ORDER — LABETALOL HYDROCHLORIDE 100 MG/1
100 TABLET, FILM COATED ORAL
Qty: 180 | Refills: 2 | Status: ACTIVE | COMMUNITY
Start: 1900-01-01 | End: 1900-01-01

## 2025-03-11 ENCOUNTER — TRANSCRIPTION ENCOUNTER (OUTPATIENT)
Age: 45
End: 2025-03-11

## 2025-03-12 ENCOUNTER — TRANSCRIPTION ENCOUNTER (OUTPATIENT)
Age: 45
End: 2025-03-12

## 2025-06-12 ENCOUNTER — APPOINTMENT (OUTPATIENT)
Dept: CARDIOLOGY | Facility: CLINIC | Age: 45
End: 2025-06-12

## 2025-07-01 ENCOUNTER — APPOINTMENT (OUTPATIENT)
Dept: INTERNAL MEDICINE | Facility: CLINIC | Age: 45
End: 2025-07-01
Payer: COMMERCIAL

## 2025-07-01 VITALS
BODY MASS INDEX: 32.76 KG/M2 | HEIGHT: 62 IN | OXYGEN SATURATION: 99 % | HEART RATE: 70 BPM | DIASTOLIC BLOOD PRESSURE: 83 MMHG | WEIGHT: 178 LBS | SYSTOLIC BLOOD PRESSURE: 128 MMHG | TEMPERATURE: 98 F

## 2025-07-01 PROCEDURE — G2211 COMPLEX E/M VISIT ADD ON: CPT | Mod: NC

## 2025-07-01 PROCEDURE — 99213 OFFICE O/P EST LOW 20 MIN: CPT

## 2025-07-08 ENCOUNTER — APPOINTMENT (OUTPATIENT)
Dept: INTERNAL MEDICINE | Facility: CLINIC | Age: 45
End: 2025-07-08

## 2025-07-11 ENCOUNTER — APPOINTMENT (OUTPATIENT)
Dept: INTERNAL MEDICINE | Facility: CLINIC | Age: 45
End: 2025-07-11
Payer: COMMERCIAL

## 2025-07-11 VITALS — SYSTOLIC BLOOD PRESSURE: 114 MMHG | DIASTOLIC BLOOD PRESSURE: 74 MMHG | OXYGEN SATURATION: 98 % | HEART RATE: 72 BPM

## 2025-07-11 PROCEDURE — 99211 OFF/OP EST MAY X REQ PHY/QHP: CPT
